# Patient Record
Sex: FEMALE | Race: WHITE | NOT HISPANIC OR LATINO | Employment: OTHER | URBAN - METROPOLITAN AREA
[De-identification: names, ages, dates, MRNs, and addresses within clinical notes are randomized per-mention and may not be internally consistent; named-entity substitution may affect disease eponyms.]

---

## 2017-01-30 RX ORDER — BIMATOPROST 0.3 MG/ML
1 SOLUTION/ DROPS OPHTHALMIC NIGHTLY
Qty: 3 ML | Refills: 11 | Status: SHIPPED | OUTPATIENT
Start: 2017-01-30 | End: 2017-03-28 | Stop reason: SDUPTHER

## 2017-01-30 RX ORDER — BRIMONIDINE TARTRATE AND TIMOLOL MALEATE 2; 5 MG/ML; MG/ML
1 SOLUTION OPHTHALMIC 2 TIMES DAILY
Qty: 10 ML | Refills: 11 | Status: SHIPPED | OUTPATIENT
Start: 2017-01-30 | End: 2017-03-28 | Stop reason: ALTCHOICE

## 2017-01-31 ENCOUNTER — OFFICE VISIT (OUTPATIENT)
Dept: CARDIOLOGY | Facility: CLINIC | Age: 77
End: 2017-01-31
Payer: COMMERCIAL

## 2017-01-31 ENCOUNTER — CLINICAL SUPPORT (OUTPATIENT)
Dept: CARDIOLOGY | Facility: CLINIC | Age: 77
End: 2017-01-31
Payer: COMMERCIAL

## 2017-01-31 VITALS
DIASTOLIC BLOOD PRESSURE: 60 MMHG | HEART RATE: 50 BPM | SYSTOLIC BLOOD PRESSURE: 163 MMHG | HEIGHT: 65 IN | WEIGHT: 137.56 LBS | BODY MASS INDEX: 22.92 KG/M2

## 2017-01-31 DIAGNOSIS — R00.1 BRADYCARDIA: ICD-10-CM

## 2017-01-31 DIAGNOSIS — Z86.79 HISTORY OF ATRIAL FIBRILLATION: ICD-10-CM

## 2017-01-31 DIAGNOSIS — E87.1 HYPONATREMIA: ICD-10-CM

## 2017-01-31 DIAGNOSIS — I10 ESSENTIAL HYPERTENSION: Primary | ICD-10-CM

## 2017-01-31 DIAGNOSIS — R55 NEAR SYNCOPE: ICD-10-CM

## 2017-01-31 DIAGNOSIS — Z95.818 STATUS POST PLACEMENT OF IMPLANTABLE LOOP RECORDER: ICD-10-CM

## 2017-01-31 PROCEDURE — 1159F MED LIST DOCD IN RCRD: CPT | Mod: S$GLB,,, | Performed by: INTERNAL MEDICINE

## 2017-01-31 PROCEDURE — 3078F DIAST BP <80 MM HG: CPT | Mod: S$GLB,,, | Performed by: INTERNAL MEDICINE

## 2017-01-31 PROCEDURE — 99214 OFFICE O/P EST MOD 30 MIN: CPT | Mod: S$GLB,,, | Performed by: INTERNAL MEDICINE

## 2017-01-31 PROCEDURE — 3077F SYST BP >= 140 MM HG: CPT | Mod: S$GLB,,, | Performed by: INTERNAL MEDICINE

## 2017-01-31 PROCEDURE — 1157F ADVNC CARE PLAN IN RCRD: CPT | Mod: S$GLB,,, | Performed by: INTERNAL MEDICINE

## 2017-01-31 PROCEDURE — 1126F AMNT PAIN NOTED NONE PRSNT: CPT | Mod: S$GLB,,, | Performed by: INTERNAL MEDICINE

## 2017-01-31 PROCEDURE — 1160F RVW MEDS BY RX/DR IN RCRD: CPT | Mod: S$GLB,,, | Performed by: INTERNAL MEDICINE

## 2017-01-31 PROCEDURE — 99999 PR PBB SHADOW E&M-EST. PATIENT-LVL II: CPT | Mod: PBBFAC,,, | Performed by: INTERNAL MEDICINE

## 2017-01-31 PROCEDURE — 93291 INTERROG DEV EVAL SCRMS IP: CPT | Mod: S$GLB,,, | Performed by: INTERNAL MEDICINE

## 2017-01-31 RX ORDER — LISINOPRIL 10 MG/1
TABLET ORAL
Refills: 3 | Status: ON HOLD | COMMUNITY
Start: 2017-01-03 | End: 2017-08-07 | Stop reason: HOSPADM

## 2017-01-31 RX ORDER — BUSPIRONE HYDROCHLORIDE 5 MG/1
TABLET ORAL
Refills: 5 | Status: ON HOLD | COMMUNITY
Start: 2016-12-29 | End: 2017-08-07 | Stop reason: HOSPADM

## 2017-01-31 NOTE — PROGRESS NOTES
Subjective:    Patient ID:  April Lea is a 76 y.o. female who presents for follow-up of Hypertension      HPI  She comes with no complaints, no chest pain, no shortness of breath      Review of Systems   Constitution: Negative for decreased appetite, weakness, malaise/fatigue, weight gain and weight loss.   Cardiovascular: Negative for chest pain, dyspnea on exertion, leg swelling, palpitations and syncope.   Respiratory: Negative for cough and shortness of breath.    Gastrointestinal: Negative.    All other systems reviewed and are negative.       Objective:    Physical Exam   Constitutional: She is oriented to person, place, and time. She appears well-developed and well-nourished.   HENT:   Head: Normocephalic.   Eyes: Pupils are equal, round, and reactive to light.   Neck: Normal range of motion. Neck supple. No JVD present. Carotid bruit is not present. No thyromegaly present.   Cardiovascular: Normal rate, regular rhythm, normal heart sounds, intact distal pulses and normal pulses.  PMI is not displaced.  Exam reveals no gallop.    No murmur heard.  Pulmonary/Chest: Effort normal and breath sounds normal.   Abdominal: Soft. Normal appearance. She exhibits no mass. There is no hepatosplenomegaly. There is no tenderness.   Musculoskeletal: Normal range of motion. She exhibits no edema.   Neurological: She is alert and oriented to person, place, and time. She has normal strength and normal reflexes. No sensory deficit.   Skin: Skin is warm and intact.   Psychiatric: She has a normal mood and affect.   Nursing note and vitals reviewed.        Assessment:       1. Essential hypertension    2. History of atrial fibrillation    3. Hyponatremia         Plan:     Continue all cardiac medications  6 m f/u

## 2017-02-28 PROBLEM — R55 SYNCOPE: Status: ACTIVE | Noted: 2017-02-28

## 2017-02-28 PROBLEM — E16.2 HYPOGLYCEMIA: Status: ACTIVE | Noted: 2017-02-28

## 2017-03-01 PROBLEM — E16.2 HYPOGLYCEMIA: Status: RESOLVED | Noted: 2017-02-28 | Resolved: 2017-03-01

## 2017-03-04 PROBLEM — I49.5 SICK SINUS SYNDROME: Status: ACTIVE | Noted: 2017-03-04

## 2017-03-04 PROBLEM — E10.59 TYPE 1 DIABETES MELLITUS WITH CIRCULATORY COMPLICATION: Status: ACTIVE | Noted: 2017-03-04

## 2017-03-06 ENCOUNTER — TELEPHONE (OUTPATIENT)
Dept: CARDIOLOGY | Facility: CLINIC | Age: 77
End: 2017-03-06

## 2017-03-06 DIAGNOSIS — I49.5 SICK SINUS SYNDROME: ICD-10-CM

## 2017-03-06 DIAGNOSIS — Z95.0 CARDIAC PACEMAKER IN SITU: Primary | ICD-10-CM

## 2017-03-06 NOTE — TELEPHONE ENCOUNTER
----- Message from Narinder Mishra sent at 3/6/2017  8:20 AM CST -----  Contact: pt's daughter Rita Honeycutt needs a hospital f/u appt(1 week)  Call Back#[phone]  Thanks

## 2017-03-15 ENCOUNTER — DOCUMENTATION ONLY (OUTPATIENT)
Dept: CARDIOLOGY | Facility: CLINIC | Age: 77
End: 2017-03-15

## 2017-03-15 ENCOUNTER — CLINICAL SUPPORT (OUTPATIENT)
Dept: CARDIOLOGY | Facility: CLINIC | Age: 77
End: 2017-03-15
Payer: COMMERCIAL

## 2017-03-15 DIAGNOSIS — Z95.0 CARDIAC PACEMAKER IN SITU: ICD-10-CM

## 2017-03-15 DIAGNOSIS — I49.5 SICK SINUS SYNDROME: ICD-10-CM

## 2017-03-15 PROCEDURE — 93280 PM DEVICE PROGR EVAL DUAL: CPT | Mod: S$GLB,,, | Performed by: INTERNAL MEDICINE

## 2017-03-15 RX ORDER — AMLODIPINE BESYLATE 5 MG/1
5 TABLET ORAL DAILY
COMMUNITY
Start: 2017-03-15 | End: 2017-04-10 | Stop reason: SDUPTHER

## 2017-03-15 RX ORDER — FUROSEMIDE 20 MG/1
20 TABLET ORAL
COMMUNITY
Start: 2017-03-15 | End: 2017-03-28

## 2017-03-24 ENCOUNTER — TELEPHONE (OUTPATIENT)
Dept: CARDIOLOGY | Facility: CLINIC | Age: 77
End: 2017-03-24

## 2017-03-24 NOTE — TELEPHONE ENCOUNTER
----- Message from Nichole Ruiz RN sent at 3/23/2017  9:21 AM CDT -----  Contact: daughter-Rita Rendon      ----- Message -----     From: Jinny Feliz LPN     Sent: 3/17/2017  12:39 PM       To: Nichole Ruiz RN    Please call daughter back re: instructions you gave at [facility]. Please call her back  ----- Message -----     From: Irina Oakes     Sent: 3/17/2017  11:57 AM       To: [facility] Staff    Needs to discuss Lasiks prescription. Please call back at .

## 2017-03-27 ENCOUNTER — TELEPHONE (OUTPATIENT)
Dept: CARDIOLOGY | Facility: CLINIC | Age: 77
End: 2017-03-27

## 2017-03-27 NOTE — TELEPHONE ENCOUNTER
Ms Rendon called in referrence to her mother April Lea.  She has received the monitor for her pacemaker.  Would like a call to check if it is hooked up correctly.

## 2017-03-27 NOTE — TELEPHONE ENCOUNTER
Ms. Rendon is calling in reference to a refill of the Metoprolol that was given in the hospital to her mother. The RX will run out prior to Ms. Hdzpipe seeing Dr. Garcia on 04/18/17.  Please call with refill.

## 2017-03-28 ENCOUNTER — OFFICE VISIT (OUTPATIENT)
Dept: OPHTHALMOLOGY | Facility: CLINIC | Age: 77
End: 2017-03-28
Attending: OPHTHALMOLOGY
Payer: COMMERCIAL

## 2017-03-28 DIAGNOSIS — H35.413 LATTICE DEGENERATION OF RETINA, BILATERAL: ICD-10-CM

## 2017-03-28 DIAGNOSIS — H40.033 ANATOMICAL NARROW ANGLE GLAUCOMA, BILATERAL: Primary | ICD-10-CM

## 2017-03-28 DIAGNOSIS — H20.12 CHRONIC ANTERIOR UVEITIS, LEFT: ICD-10-CM

## 2017-03-28 DIAGNOSIS — H52.7 REFRACTIVE ERROR: ICD-10-CM

## 2017-03-28 DIAGNOSIS — H43.819 PVD (POSTERIOR VITREOUS DETACHMENT), UNSPECIFIED LATERALITY: ICD-10-CM

## 2017-03-28 DIAGNOSIS — Z96.1 PSEUDOPHAKIA: ICD-10-CM

## 2017-03-28 DIAGNOSIS — H40.033 ANATOMICAL NARROW ANGLE GLAUCOMA, BILATERAL: ICD-10-CM

## 2017-03-28 DIAGNOSIS — E10.9 TYPE 1 DIABETES MELLITUS WITHOUT RETINOPATHY: ICD-10-CM

## 2017-03-28 DIAGNOSIS — H25.11 NUCLEAR SCLEROSIS, RIGHT: ICD-10-CM

## 2017-03-28 DIAGNOSIS — H35.723 RETINAL PIGMENT EPITHELIAL DETACHMENT OF BOTH EYES: ICD-10-CM

## 2017-03-28 DIAGNOSIS — D31.31 CHOROIDAL NEVUS, RIGHT: ICD-10-CM

## 2017-03-28 PROCEDURE — 92012 INTRM OPH EXAM EST PATIENT: CPT | Mod: S$GLB,,, | Performed by: OPHTHALMOLOGY

## 2017-03-28 PROCEDURE — 92083 EXTENDED VISUAL FIELD XM: CPT | Mod: S$GLB,,, | Performed by: OPHTHALMOLOGY

## 2017-03-28 PROCEDURE — 99999 PR PBB SHADOW E&M-EST. PATIENT-LVL III: CPT | Mod: PBBFAC,,, | Performed by: OPHTHALMOLOGY

## 2017-03-28 PROCEDURE — 76514 ECHO EXAM OF EYE THICKNESS: CPT | Mod: S$GLB,,, | Performed by: OPHTHALMOLOGY

## 2017-03-28 RX ORDER — BIMATOPROST 0.3 MG/ML
1 SOLUTION/ DROPS OPHTHALMIC NIGHTLY
Qty: 3 ML | Refills: 11 | Status: SHIPPED | OUTPATIENT
Start: 2017-03-28 | End: 2018-04-12 | Stop reason: SDUPTHER

## 2017-03-28 RX ORDER — DORZOLAMIDE HYDROCHLORIDE AND TIMOLOL MALEATE 20; 5 MG/ML; MG/ML
1 SOLUTION/ DROPS OPHTHALMIC 2 TIMES DAILY
Qty: 10 ML | Refills: 11 | Status: SHIPPED | OUTPATIENT
Start: 2017-03-28 | End: 2017-03-28 | Stop reason: SDUPTHER

## 2017-03-28 RX ORDER — BRIMONIDINE TARTRATE 2 MG/ML
1 SOLUTION/ DROPS OPHTHALMIC 3 TIMES DAILY
Qty: 10 ML | Refills: 11 | Status: SHIPPED | OUTPATIENT
Start: 2017-03-28 | End: 2017-03-28 | Stop reason: SDUPTHER

## 2017-03-28 RX ORDER — BIMATOPROST 0.3 MG/ML
1 SOLUTION/ DROPS OPHTHALMIC NIGHTLY
Qty: 3 ML | Refills: 11 | Status: SHIPPED | OUTPATIENT
Start: 2017-03-28 | End: 2017-03-28 | Stop reason: SDUPTHER

## 2017-03-28 RX ORDER — DORZOLAMIDE HYDROCHLORIDE AND TIMOLOL MALEATE 20; 5 MG/ML; MG/ML
1 SOLUTION/ DROPS OPHTHALMIC 2 TIMES DAILY
Qty: 10 ML | Refills: 11 | Status: ON HOLD | OUTPATIENT
Start: 2017-03-28 | End: 2017-08-07 | Stop reason: HOSPADM

## 2017-03-28 RX ORDER — BRIMONIDINE TARTRATE 2 MG/ML
1 SOLUTION/ DROPS OPHTHALMIC 3 TIMES DAILY
Qty: 10 ML | Refills: 11 | Status: SHIPPED | OUTPATIENT
Start: 2017-03-28 | End: 2017-09-05

## 2017-03-28 NOTE — PROGRESS NOTES
"HPI     3 month IOP, CCT, HVF today. Denies eye pain , using Lumigan ou HS,   Combigan ou bid, Dorzolomide ou TID, Illevro OS daily. States she would   like to switch the Illevro because its cost way to much. States gtt   compliance.        Last edited by Hiwot He on 3/28/2017  9:19 AM.         Assessment /Plan     For exam results, see Encounter Report.    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 To replace Ilevro  Dispense: 3 mL; Refill: 11  -     bimatoprost (LUMIGAN) 0.03 % ophthalmic drops; Place 1 drop into the left eye every evening.  Dispense: 3 mL; Refill: 11  -     brimonidine 0.2% (ALPHAGAN) 0.2 % Drop; Place 1 drop into the left eye 3 (three) times daily. Use when Dorzolamide runs out (orange lid)  Dispense: 10 mL; Refill: 11  -     dorzolamide-timolol 2-0.5% (COSOPT) 22.3-6.8 mg/mL ophthalmic solution; Place 1 drop into both eyes 2 (two) times daily. Use when Combigan runs out  Dispense: 10 mL; Refill: 11            Anatomical narrow angle glaucoma, bilateral - appears controlled on Lumigan, Combigan, and Dorzolamide - will switch to generics in future (keeping generic bimatoprost, switching Combigan to Cosopt BID OU, switch Dorzolamide to Brimonidine TID OS. HRT done last visit - borderline SN OD, OS thin ST borderline SN. HVF today 3/28/17 - scattered defects, "WNL". /570. RTC 4 months for IOP check.     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 RD precautions.    Choroidal nevus, right - flat with no SRF, no orange, no significant drusen noted    PVD (posterior vitreous detachment), unspecified laterality - RD precautions    Refractive error -  Minimal change              Records reviewed from Deepak Jimenez M.D. - I cannot read the dates?  Looks like last note from Dr. Jimenez was 4/4/16  Tn 20 OD, 21 OS at 1:40 pm  Tmax 23 OD, 36 OS  Conj - rare follicle  K - mild punctate stain I OD, mod OS; no edema; on old KP central OS, min endopigment OS  AC - clear OD; trace flare no cells OS  P - 3.0-3.5 OD, RRL; 3.5-4.0 OS with sphincterotomy I, otherwise RRL  I - no NV  PCIOL OS  DFE OD:  C:D 0.50 x 0.35, oval  Macula - pigment mottling, rare MA T macula, no edema/exudate  Rare blot heme outside macula, few MA;s  PVD with Vizcarra ring  Nevus I 2.5 x 2.0 DD in size  Point traction at 10:45  Vit traction 10:00 mid periphery  Lattice 4:00-5:00; 5:00-5:30, 5:45-6:00; 6:00-6:30  No RT's  DFE OS:  C:D 0.55 x 0.35-0.4  Macula with shallow RPE detachments S macula, rare MA T macuula  No edema or exudates  No blot H  Lattice 10:45-11:00, 11:45-12:15, 1:30-1:45, 4:00-6:00  Point traction posterior to lattice 1:30    Prior note;  Gonio OD - grade IV open 360 to scleral spur, no PAS, iris processes greatest N, mild narrowing of approach  Gonio OS - grade IV open 360 to scleral spur except for PAS 4:00, 4:30, 5:30, 5:45 with grade II open 5:45-7:00, PAS 7:00-9:00, 9;15, 9:30, 10:00, 10:15, 1:30, 1:40, 2:00, 2:30  Mild narrowing of approach  Tn 18//20 at 1:45 pm    Dr. Cheryl Reddy 9/26/16  Ta 11//9  Conj cyst OS  K - rare PEE's OD; K scars OS, rare endopigment OS, diffuse PEE's  Nevanac decreased to TWICE  DAILY, LS TWICE DAILY OS, PF AT's  Meds; Lumigain 0.03% OS, Combigan Q12h OU, Dorzolamide TID OS

## 2017-03-28 NOTE — MR AVS SNAPSHOT
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       These Medications        Disp Refills Start End    nepafenac (NEVANAC) 0.1 % ophthalmic suspension 3 mL 11 3/28/2017     Place 1 drop into the left eye 3 (three) times daily. To replace Ilevro - Left Eye    [facility] [address] [place] [address] AT [facility] [address] [facility] [address] Ph #: [phone]       bimatoprost (LUMIGAN) 0.03 % ophthalmic drops 3 mL 11 3/28/2017     Place 1 drop into the left eye every evening. - Left Eye    Pharmacy: [facility] [address] [place] [address] [facility] [address] Ph #: [phone]       brimonidine 0.2% (ALPHAGAN) 0.2 % Drop 10 mL 11 3/28/2017 3/28/2018    Place 1 drop into the left eye 3 (three) times daily. Use when Dorzolamide  runs out (orange lid) - Left Eye    Pharmacy: [email or web address] [id number] [place], [place] [id number] AT [facility] [address] Ph #: [phone]       dorzolamide-timolol 2-0.5% (COSOPT) 22.3-6.8 mg/mL ophthalmic solution 10 mL 11 3/28/2017 3/28/2018    Place 1 drop into both eyes 2 (two) times daily. Use when Combigan runs out - Both Eyes    Pharmacy: [facility] [address] [place] [address] [address] AT [facility] [address] Ph #: [phone]         [facility] On Call     [facility] - 24/7 Assistance  Registered nurses in the [facility] provide clinical advisement, health education, appointment booking, and other advisory services.  Call for this free service at [phone].             Medications           Message regarding Medications     Verify the changes and/or additions to your medication regime listed below are the same as discussed with your clinician today.  If any of these changes or additions are incorrect, please notify your healthcare provider.        START taking these NEW medications        Refills    brimonidine 0.2% (ALPHAGAN) 0.2 % Drop 11    Sig: Place 1 drop into the left eye 3 (three) times daily. Use when Dorzolamide runs out (orange lid)    Class: Print    Route: Left Eye    dorzolamide-timolol 2-0.5% (COSOPT) 22.3-6.8 mg/mL ophthalmic solution 11    Sig: Place 1 drop into both eyes 2 (two) times daily. Use when Combigan runs out    Class: Print    Route: Both Eyes      CHANGE how you are taking these medications     Start Taking Instead of    nepafenac (NEVANAC) 0.1 % ophthalmic suspension nepafenac (NEVANAC) 0.1 % ophthalmic suspension  (Previously Discontinued)    Dosage:  Place 1 drop into the left eye 3 (three) times daily. To replace Ilevro Dosage:  Place 3 drops into the left eye 4 (four) times daily.    Reason for Change:  Alternate therapy       STOP taking these medications     alendronate (FOSAMAX) 70 MG tablet Take 70 mg  by mouth every 7 days.    furosemide (LASIX) 20 MG tablet Take 20 mg by mouth as needed (May take up to twice per week as needed for swelling).    brimonidine-timolol (COMBIGAN) 0.2-0.5 % Drop Place 1 drop into both eyes 2 (two) times daily.    dorzolamide (TRUSOPT) 2 % ophthalmic solution Place 1 drop into the left eye 3 (three) times daily.           Verify that the below list of medications is an accurate representation of the medications you are currently taking.  If none reported, the list may be blank. If incorrect, please contact your healthcare provider. Carry this list with you in case of emergency.           Current Medications     amiodarone (PACERONE) 200 MG Tab Take 1 tablet (200 mg total) by mouth every Mon, Tues, Wed, Thurs, Fri.    amlodipine (NORVASC) 5 MG tablet Take 5 mg by mouth once daily.    aspirin 81 MG Chew Take 81 mg by mouth once daily.    bimatoprost (LUMIGAN) 0.03 % ophthalmic drops Place 1 drop into the left eye every evening.    brimonidine 0.2% (ALPHAGAN) 0.2 % Drop Place 1 drop into the left eye 3 (three) times daily. Use when Dorzolamide runs out (orange lid)    busPIRone (BUSPAR) 5 MG Tab Take 10 mg By Mouth Twice a Day    calcium citrate (CALCITRATE) 200 mg (950 mg) tablet Take 1,000 mg by mouth once daily.     dorzolamide-timolol 2-0.5% (COSOPT) 22.3-6.8 mg/mL ophthalmic solution Place 1 drop into both eyes 2 (two) times daily. Use when Combigan runs out    ferrous sulfate 324 mg (65 mg iron) TbEC Take 325 mg by mouth once daily.    insulin lispro (HUMALOG) 100 unit/mL injection Inject into the skin 3 (three) times daily before meals. Insulin pump    levothyroxine (SYNTHROID) 112 MCG tablet Take 112 mcg by mouth once daily.    lisinopril 10 MG tablet Take One Tablet By Mouth Twice a Day    MAGNESIUM ORAL Take 500 mg by mouth once daily.    metoprolol succinate (TOPROL-XL) 100 MG 24 hr tablet Take 1 tablet (100 mg total) by mouth every morning.    MULTIVIT-IRON-MIN-FOLIC ACID  3,500-18-0.4 UNIT-MG-MG ORAL CHEW Take 1 tablet by mouth once daily.     nepafenac (NEVANAC) 0.1 % ophthalmic suspension Place 1 drop into the left eye 3 (three) times daily. To replace Ilevro    pravastatin (PRAVACHOL) 40 MG tablet Take 40 mg by mouth once daily.     vitamin D 1000 units Tab Take 185 mg by mouth once daily.           Clinical Reference Information           Allergies as of 3/28/2017     No Known Allergies      Immunizations Administered on Date of Encounter - 3/28/2017     None      [facility] Sign-Up     Activating your [facility] account is as easy as 1-2-3!     1) Visit [email or web address], select Sign Up Now, enter this activation code and your date of birth, then select Next.  [email or web address]  Expires: 4/19/2017 12:01 PM      2) Create a username and password to use when you visit [facility] in the future and select a security question in case you lose your password and select Next.    3) Enter your e-mail address and click Sign Up!    Additional Information  If you have questions, please e-mail [email or web address] or call [phone] to talk to our [facility] staff. Remember, [facility] is NOT to be used for urgent needs. For medical emergencies, dial 911.         Language Assistance Services     ATTENTION: Language assistance services are available, free of charge. Please call [phone].      ATENCIÓN: Si habla español, tiene a murillo disposición servicios gratuitos de asistencia lingüística. Llame al [phone].     [facility] Ý: N?u b?n nói Ti?ng Vi?t, có các d?ch v? h? tr? ngôn ng? mi?n phí dành cho b?n. G?i s? [phone].         [facility] complies with applicable Federal civil rights laws and does not discriminate on the basis of race, color, national origin, age, disability, or sex.

## 2017-03-29 RX ORDER — METOPROLOL SUCCINATE 100 MG/1
100 TABLET, EXTENDED RELEASE ORAL EVERY MORNING
Qty: 30 TABLET | Refills: 5 | Status: ON HOLD | OUTPATIENT
Start: 2017-03-29 | End: 2017-08-07 | Stop reason: HOSPADM

## 2017-03-30 ENCOUNTER — DOCUMENTATION ONLY (OUTPATIENT)
Dept: CARDIOLOGY | Facility: CLINIC | Age: 77
End: 2017-03-30

## 2017-03-30 NOTE — PROGRESS NOTES
Patient requested refill of Metoprolol Succinate 100mg every day (ordered while in hospital).  Reviewed w/ Dr. Byrnes and called into [facility] [phone] (Phone).

## 2017-04-10 RX ORDER — AMLODIPINE BESYLATE 5 MG/1
5 TABLET ORAL DAILY
Qty: 90 TABLET | Refills: 3 | Status: ON HOLD | OUTPATIENT
Start: 2017-04-10 | End: 2017-08-07 | Stop reason: HOSPADM

## 2017-04-18 ENCOUNTER — OFFICE VISIT (OUTPATIENT)
Dept: CARDIOLOGY | Facility: CLINIC | Age: 77
End: 2017-04-18
Payer: COMMERCIAL

## 2017-04-18 ENCOUNTER — CLINICAL SUPPORT (OUTPATIENT)
Dept: CARDIOLOGY | Facility: CLINIC | Age: 77
End: 2017-04-18
Payer: COMMERCIAL

## 2017-04-18 VITALS
DIASTOLIC BLOOD PRESSURE: 62 MMHG | BODY MASS INDEX: 23.47 KG/M2 | HEIGHT: 65 IN | HEART RATE: 60 BPM | WEIGHT: 140.88 LBS | SYSTOLIC BLOOD PRESSURE: 131 MMHG

## 2017-04-18 DIAGNOSIS — I49.5 SICK SINUS SYNDROME: ICD-10-CM

## 2017-04-18 DIAGNOSIS — Z95.0 CARDIAC PACEMAKER IN SITU: ICD-10-CM

## 2017-04-18 DIAGNOSIS — I10 ESSENTIAL HYPERTENSION: ICD-10-CM

## 2017-04-18 DIAGNOSIS — I48.0 PAF (PAROXYSMAL ATRIAL FIBRILLATION): Primary | ICD-10-CM

## 2017-04-18 PROCEDURE — 3078F DIAST BP <80 MM HG: CPT | Mod: S$GLB,,, | Performed by: INTERNAL MEDICINE

## 2017-04-18 PROCEDURE — 99999 PR PBB SHADOW E&M-EST. PATIENT-LVL II: CPT | Mod: PBBFAC,,, | Performed by: INTERNAL MEDICINE

## 2017-04-18 PROCEDURE — 93280 PM DEVICE PROGR EVAL DUAL: CPT | Mod: S$GLB,,, | Performed by: INTERNAL MEDICINE

## 2017-04-18 PROCEDURE — 99024 POSTOP FOLLOW-UP VISIT: CPT | Mod: S$GLB,,, | Performed by: INTERNAL MEDICINE

## 2017-04-18 PROCEDURE — 1159F MED LIST DOCD IN RCRD: CPT | Mod: S$GLB,,, | Performed by: INTERNAL MEDICINE

## 2017-04-18 PROCEDURE — 1160F RVW MEDS BY RX/DR IN RCRD: CPT | Mod: S$GLB,,, | Performed by: INTERNAL MEDICINE

## 2017-04-18 PROCEDURE — 1126F AMNT PAIN NOTED NONE PRSNT: CPT | Mod: S$GLB,,, | Performed by: INTERNAL MEDICINE

## 2017-04-18 PROCEDURE — 3075F SYST BP GE 130 - 139MM HG: CPT | Mod: S$GLB,,, | Performed by: INTERNAL MEDICINE

## 2017-04-18 RX ORDER — BRIMONIDINE TARTRATE AND TIMOLOL MALEATE 2; 5 MG/ML; MG/ML
1 SOLUTION OPHTHALMIC
Status: ON HOLD | COMMUNITY
End: 2017-08-07 | Stop reason: HOSPADM

## 2017-04-18 NOTE — PROGRESS NOTES
Subjective:    Patient ID:  April Lea is a 76 y.o. female who presents for follow-up of SSS    HPI  She comes with no complaints, no chest pain, no shortness of breath  Has PPM 6 weeks ago for SSS    Review of Systems   Constitution: Negative for decreased appetite, weakness, malaise/fatigue, weight gain and weight loss.   Cardiovascular: Negative for chest pain, dyspnea on exertion, leg swelling, palpitations and syncope.   Respiratory: Negative for cough and shortness of breath.    Gastrointestinal: Negative.    All other systems reviewed and are negative.       Objective:    Physical Exam   Constitutional: She is oriented to person, place, and time. She appears well-developed and well-nourished.   HENT:   Head: Normocephalic.   Eyes: Pupils are equal, round, and reactive to light.   Neck: Normal range of motion. Neck supple. No JVD present. Carotid bruit is not present. No thyromegaly present.   Cardiovascular: Normal rate, regular rhythm, normal heart sounds, intact distal pulses and normal pulses.  PMI is not displaced.  Exam reveals no gallop.    No murmur heard.  Pulmonary/Chest: Effort normal and breath sounds normal.   Abdominal: Soft. Normal appearance. She exhibits no mass. There is no hepatosplenomegaly. There is no tenderness.   Musculoskeletal: Normal range of motion. She exhibits no edema.   Neurological: She is alert and oriented to person, place, and time. She has normal strength and normal reflexes. No sensory deficit.   Skin: Skin is warm and intact.   Psychiatric: She has a normal mood and affect.   Nursing note and vitals reviewed.        Assessment:       1. PAF (paroxysmal atrial fibrillation)    2. Essential hypertension    3. Sick sinus syndrome         Plan:     Continue all cardiac medications  Regular exercise program  6 m f/u

## 2017-06-12 RX ORDER — AMIODARONE HYDROCHLORIDE 200 MG/1
200 TABLET ORAL
Qty: 20 TABLET | Refills: 1 | Status: ON HOLD | OUTPATIENT
Start: 2017-06-12 | End: 2017-08-07 | Stop reason: HOSPADM

## 2017-07-22 PROBLEM — G45.9 TIA (TRANSIENT ISCHEMIC ATTACK): Status: ACTIVE | Noted: 2017-07-22

## 2017-07-23 PROBLEM — I63.9 ACUTE CVA (CEREBROVASCULAR ACCIDENT): Status: ACTIVE | Noted: 2017-07-23

## 2017-07-23 PROBLEM — I63.521 ACUTE ISCHEMIC RIGHT ANTERIOR CEREBRAL ARTERY (ACA) STROKE: Status: ACTIVE | Noted: 2017-07-23

## 2017-07-23 PROBLEM — R51.9 HEADACHE: Status: ACTIVE | Noted: 2017-07-23

## 2017-07-23 PROBLEM — G45.9 TIA (TRANSIENT ISCHEMIC ATTACK): Status: RESOLVED | Noted: 2017-07-22 | Resolved: 2017-07-23

## 2017-07-23 PROBLEM — G60.9 IDIOPATHIC PERIPHERAL NEUROPATHY: Status: ACTIVE | Noted: 2017-07-23

## 2017-07-23 PROBLEM — I63.412 CEREBRAL INFARCTION DUE TO EMBOLISM OF LEFT MIDDLE CEREBRAL ARTERY: Status: ACTIVE | Noted: 2017-07-23

## 2017-07-23 PROBLEM — I63.432 CEREBRAL INFARCTION DUE TO EMBOLISM OF LEFT POSTERIOR CEREBRAL ARTERY: Status: ACTIVE | Noted: 2017-07-23

## 2017-07-23 PROBLEM — I63.89 ACUTE ARTERIAL ISCHEMIC STROKE, MULTIFOCAL, MULTIPLE VASCULAR TERRITORIES: Status: ACTIVE | Noted: 2017-07-23

## 2017-08-01 PROBLEM — G60.9 IDIOPATHIC PERIPHERAL NEUROPATHY: Status: ACTIVE | Noted: 2017-08-01

## 2017-08-07 ENCOUNTER — TELEPHONE (OUTPATIENT)
Dept: CARDIOLOGY | Facility: CLINIC | Age: 77
End: 2017-08-07

## 2017-08-07 NOTE — TELEPHONE ENCOUNTER
----- Message from Marilee Peters sent at 8/7/2017  2:58 PM CDT -----  Contact: Daughter Zoya [phone]  She was discharged from [facility] today, she was admitted for a stroke.  She was instructed to follow up with you in 2 weeks. Please call her about fitting her in.  Thank you!

## 2017-08-24 ENCOUNTER — OFFICE VISIT (OUTPATIENT)
Dept: CARDIOLOGY | Facility: CLINIC | Age: 77
End: 2017-08-24
Payer: COMMERCIAL

## 2017-08-24 VITALS
BODY MASS INDEX: 22.96 KG/M2 | SYSTOLIC BLOOD PRESSURE: 128 MMHG | HEIGHT: 66 IN | HEART RATE: 59 BPM | DIASTOLIC BLOOD PRESSURE: 56 MMHG | WEIGHT: 142.88 LBS

## 2017-08-24 DIAGNOSIS — E10.59 TYPE 1 DIABETES MELLITUS WITH OTHER CIRCULATORY COMPLICATION: ICD-10-CM

## 2017-08-24 DIAGNOSIS — I49.5 SICK SINUS SYNDROME: ICD-10-CM

## 2017-08-24 DIAGNOSIS — I63.432 CEREBRAL INFARCTION DUE TO EMBOLISM OF LEFT POSTERIOR CEREBRAL ARTERY: ICD-10-CM

## 2017-08-24 DIAGNOSIS — Z86.79 HISTORY OF ATRIAL FIBRILLATION: Primary | ICD-10-CM

## 2017-08-24 DIAGNOSIS — I10 ESSENTIAL HYPERTENSION: ICD-10-CM

## 2017-08-24 PROCEDURE — 99214 OFFICE O/P EST MOD 30 MIN: CPT | Mod: S$GLB,,, | Performed by: INTERNAL MEDICINE

## 2017-08-24 PROCEDURE — 1159F MED LIST DOCD IN RCRD: CPT | Mod: S$GLB,,, | Performed by: INTERNAL MEDICINE

## 2017-08-24 PROCEDURE — 3008F BODY MASS INDEX DOCD: CPT | Mod: S$GLB,,, | Performed by: INTERNAL MEDICINE

## 2017-08-24 PROCEDURE — 1157F ADVNC CARE PLAN IN RCRD: CPT | Mod: S$GLB,,, | Performed by: INTERNAL MEDICINE

## 2017-08-24 PROCEDURE — 3078F DIAST BP <80 MM HG: CPT | Mod: S$GLB,,, | Performed by: INTERNAL MEDICINE

## 2017-08-24 PROCEDURE — 1126F AMNT PAIN NOTED NONE PRSNT: CPT | Mod: S$GLB,,, | Performed by: INTERNAL MEDICINE

## 2017-08-24 PROCEDURE — 3074F SYST BP LT 130 MM HG: CPT | Mod: S$GLB,,, | Performed by: INTERNAL MEDICINE

## 2017-08-24 PROCEDURE — 99999 PR PBB SHADOW E&M-EST. PATIENT-LVL II: CPT | Mod: PBBFAC,,, | Performed by: INTERNAL MEDICINE

## 2017-08-24 NOTE — PROGRESS NOTES
Subjective:    Patient ID:  April Lea is a 77 y.o. female who presents for follow-up of CVD    HPI  Admitted to [facility] last month with CVA, no evidence of atrial fibrillation  Started on xarelto  She comes with no complaints, no chest pain, no shortness of breath      Review of Systems   Constitution: Negative for decreased appetite, weakness, malaise/fatigue, weight gain and weight loss.   Cardiovascular: Negative for chest pain, dyspnea on exertion, leg swelling, palpitations and syncope.   Respiratory: Negative for cough and shortness of breath.    Gastrointestinal: Negative.    All other systems reviewed and are negative.       Objective:    Physical Exam   Constitutional: She is oriented to person, place, and time. She appears well-developed and well-nourished.   HENT:   Head: Normocephalic.   Eyes: Pupils are equal, round, and reactive to light.   Neck: Normal range of motion. Neck supple. No JVD present. Carotid bruit is not present. No thyromegaly present.   Cardiovascular: Normal rate, regular rhythm, normal heart sounds, intact distal pulses and normal pulses.  PMI is not displaced.  Exam reveals no gallop.    No murmur heard.  Pulmonary/Chest: Effort normal and breath sounds normal.   Abdominal: Soft. Normal appearance. She exhibits no mass. There is no hepatosplenomegaly. There is no tenderness.   Musculoskeletal: Normal range of motion. She exhibits no edema.   Neurological: She is alert and oriented to person, place, and time. She has normal strength and normal reflexes. No sensory deficit.   Skin: Skin is warm and intact.   Psychiatric: She has a normal mood and affect.   Nursing note and vitals reviewed.        Assessment:       1. History of atrial fibrillation    2. Essential hypertension    3. Sick sinus syndrome    4. Type 1 diabetes mellitus with other circulatory complication    5. Cerebral infarction due to embolism of left posterior cerebral artery         Plan:     Continue all cardiac  medications  Regular exercise program  6 m f/u

## 2017-08-28 ENCOUNTER — CLINICAL SUPPORT (OUTPATIENT)
Dept: CARDIOLOGY | Facility: CLINIC | Age: 77
End: 2017-08-28
Payer: COMMERCIAL

## 2017-08-28 DIAGNOSIS — I49.5 SICK SINUS SYNDROME: ICD-10-CM

## 2017-08-28 DIAGNOSIS — Z95.0 CARDIAC PACEMAKER IN SITU: ICD-10-CM

## 2017-08-28 DIAGNOSIS — Z95.0 CARDIAC PACEMAKER IN SITU: Primary | ICD-10-CM

## 2017-08-28 PROCEDURE — 93296 REM INTERROG EVL PM/IDS: CPT | Mod: S$GLB,,, | Performed by: INTERNAL MEDICINE

## 2017-08-28 PROCEDURE — 93294 REM INTERROG EVL PM/LDLS PM: CPT | Mod: S$GLB,,, | Performed by: INTERNAL MEDICINE

## 2017-09-05 ENCOUNTER — OFFICE VISIT (OUTPATIENT)
Dept: OPHTHALMOLOGY | Facility: CLINIC | Age: 77
End: 2017-09-05
Payer: COMMERCIAL

## 2017-09-05 DIAGNOSIS — H43.819 PVD (POSTERIOR VITREOUS DETACHMENT), UNSPECIFIED LATERALITY: ICD-10-CM

## 2017-09-05 DIAGNOSIS — H35.723 RETINAL PIGMENT EPITHELIAL DETACHMENT OF BOTH EYES: ICD-10-CM

## 2017-09-05 DIAGNOSIS — H52.7 REFRACTIVE ERROR: ICD-10-CM

## 2017-09-05 DIAGNOSIS — D31.31 CHOROIDAL NEVUS, RIGHT: ICD-10-CM

## 2017-09-05 DIAGNOSIS — Z96.1 PSEUDOPHAKIA: ICD-10-CM

## 2017-09-05 DIAGNOSIS — E10.9 TYPE 1 DIABETES MELLITUS WITHOUT RETINOPATHY: ICD-10-CM

## 2017-09-05 DIAGNOSIS — H35.413 LATTICE DEGENERATION OF RETINA, BILATERAL: ICD-10-CM

## 2017-09-05 DIAGNOSIS — H25.11 NUCLEAR SCLEROSIS, RIGHT: ICD-10-CM

## 2017-09-05 DIAGNOSIS — H40.033 ANATOMICAL NARROW ANGLE GLAUCOMA, BILATERAL: Primary | ICD-10-CM

## 2017-09-05 DIAGNOSIS — H20.12 CHRONIC ANTERIOR UVEITIS, LEFT: ICD-10-CM

## 2017-09-05 PROCEDURE — 92012 INTRM OPH EXAM EST PATIENT: CPT | Mod: S$GLB,,, | Performed by: OPHTHALMOLOGY

## 2017-09-05 PROCEDURE — 99999 PR PBB SHADOW E&M-EST. PATIENT-LVL III: CPT | Mod: PBBFAC,,, | Performed by: OPHTHALMOLOGY

## 2017-09-05 RX ORDER — LEVOTHYROXINE SODIUM 75 UG/1
75 TABLET ORAL DAILY
COMMUNITY

## 2017-09-05 NOTE — PROGRESS NOTES
"HPI     Glaucoma    Additional comments: 4 month iop ck           Comments   DLS: 3/28/17    Pt states she had a stroke on 7/22/17 and was in the hospital for about 2   weeks. No visual changes that she noticed. No floaters or flashes.     Gtts: Combigan BID, AT's OU, Dorzolamide TID, Lumigan QHS, Ilevro BID OS,          Last edited by Cheryle Quintana on 9/5/2017  2:12 PM. (History)        ROS     Positive for: Neurological (CVA 7/22/17; denies neuropathy; denies   seizure/tremor/restless legs ), Musculoskeletal (osteopenia), Endocrine   (DM1 on insulin pump since age 37; hypothyroid), Cardiovascular (HTN -   controlled with meds per pt; history of irregular heartbeat; 1st degree AV   block; now has pacemaker), Eyes (CE OS couple years ago; glaucoma; history   anterior uveitis OS, RPE detachments OU), Heme/Lymph (now on Xarelto since   CVA)    Negative for: Genitourinary (denies flomax; denies nephropathy),   Respiratory (denies asthma/orthopnea)    Last edited by Malena Acevedo MD on 9/5/2017  3:03 PM.   (History)        Assessment /Plan     For exam results, see Encounter Report.    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 Last HRT - borderline SN OD, OS thin ST borderline SN. HVF today 3/28/17 - scattered defects, "WNL". /570. RTC 4 months for DFE/HRT/IOP check.     Chronic anterior uveitis, left - quiet " today. Ilevro too expensive with her insurance - will switch back to Nevanac TID OS when Ilevro runs out.    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 RD precautions.    Choroidal nevus, right - flat with no SRF, no orange, no significant drusen noted    PVD (posterior vitreous detachment), unspecified laterality - RD precautions    Refractive error -  Minimal change              Records reviewed from Deepak Jimenez M.D. - I cannot read the dates?  Looks like last note from Dr. Jimenez was 4/4/16  Tn 20 OD, 21 OS at 1:40 pm  Tmax 23 OD, 36 OS  Conj - rare follicle  K - mild punctate stain I OD, mod OS; no edema; on old KP central OS, min endopigment OS  AC - clear OD; trace flare no cells OS  P - 3.0-3.5 OD, RRL; 3.5-4.0 OS with sphincterotomy I, otherwise RRL  I - no NV  PCIOL OS  DFE OD:  C:D 0.50 x 0.35, oval  Macula - pigment mottling, rare MA T macula, no edema/exudate  Rare blot heme outside macula, few MA;s  PVD with Vizcarra ring  Nevus I 2.5 x 2.0 DD in size  Point traction at 10:45  Vit traction 10:00 mid periphery  Lattice 4:00-5:00; 5:00-5:30, 5:45-6:00; 6:00-6:30  No RT's  DFE OS:  C:D 0.55 x 0.35-0.4  Macula with shallow RPE detachments S macula, rare MA T macuula  No edema or exudates  No blot H  Lattice 10:45-11:00, 11:45-12:15, 1:30-1:45, 4:00-6:00  Point traction posterior to lattice 1:30    Prior note;  Gonio OD - grade IV open 360 to scleral spur, no PAS, iris processes greatest N, mild narrowing of approach  Gonio OS - grade IV open 360 to scleral spur except for PAS 4:00, 4:30, 5:30, 5:45 with grade II open 5:45-7:00, PAS 7:00-9:00, 9;15, 9:30, 10:00, 10:15, 1:30, 1:40, 2:00, 2:30  Mild narrowing of approach  Tn 18//20 at 1:45 pm    Dr. Cheryl Reddy  9/26/16  Ta 11//9  Conj cyst OS  K - rare PEE's OD; K scars OS, rare endopigment OS, diffuse PEE's  Nevanac decreased to TWICE DAILY, LS TWICE DAILY OS, PF AT's  Meds; Lumigain 0.03% OS, Combigan Q12h OU, Dorzolamide TID OS

## 2017-10-02 RX ORDER — AMIODARONE HYDROCHLORIDE 200 MG/1
200 TABLET ORAL
Qty: 90 TABLET | Refills: 3 | Status: SHIPPED | OUTPATIENT
Start: 2017-10-02 | End: 2017-12-05 | Stop reason: ALTCHOICE

## 2017-10-02 NOTE — TELEPHONE ENCOUNTER
----- Message from Cielo Araiza sent at 10/2/2017  1:07 PM CDT -----  Contact: Rita Rendon (Daughter)  Rita Rendon (Daughter) calling to request a new prescription for Amiodarone. Please advise.  Call back   Thanks!  [facility] [address] [place] [address] AT [facility] [address] [facility] [address]  [address] [place] [id number]  Phone: [phone] Fax: [phone]

## 2017-10-25 ENCOUNTER — TELEPHONE (OUTPATIENT)
Dept: CARDIOLOGY | Facility: CLINIC | Age: 77
End: 2017-10-25

## 2017-10-30 DIAGNOSIS — I49.5 SICK SINUS SYNDROME: ICD-10-CM

## 2017-10-30 DIAGNOSIS — Z95.0 CARDIAC PACEMAKER IN SITU: Primary | ICD-10-CM

## 2017-10-31 ENCOUNTER — IMMUNIZATION (OUTPATIENT)
Dept: FAMILY MEDICINE | Facility: CLINIC | Age: 77
End: 2017-10-31
Payer: COMMERCIAL

## 2017-10-31 ENCOUNTER — CLINICAL SUPPORT (OUTPATIENT)
Dept: CARDIOLOGY | Facility: CLINIC | Age: 77
End: 2017-10-31
Payer: COMMERCIAL

## 2017-10-31 DIAGNOSIS — I49.5 SICK SINUS SYNDROME: ICD-10-CM

## 2017-10-31 DIAGNOSIS — Z95.0 CARDIAC PACEMAKER IN SITU: ICD-10-CM

## 2017-10-31 PROCEDURE — 93280 PM DEVICE PROGR EVAL DUAL: CPT | Mod: S$GLB,,, | Performed by: INTERNAL MEDICINE

## 2017-10-31 PROCEDURE — 90471 IMMUNIZATION ADMIN: CPT | Mod: S$GLB,,, | Performed by: INTERNAL MEDICINE

## 2017-10-31 PROCEDURE — 90662 IIV NO PRSV INCREASED AG IM: CPT | Mod: S$GLB,,, | Performed by: INTERNAL MEDICINE

## 2017-12-05 ENCOUNTER — OFFICE VISIT (OUTPATIENT)
Dept: OPHTHALMOLOGY | Facility: CLINIC | Age: 77
End: 2017-12-05
Payer: COMMERCIAL

## 2017-12-05 DIAGNOSIS — H35.413 LATTICE DEGENERATION OF RETINA, BILATERAL: ICD-10-CM

## 2017-12-05 DIAGNOSIS — E10.9 TYPE 1 DIABETES MELLITUS WITHOUT RETINOPATHY: ICD-10-CM

## 2017-12-05 DIAGNOSIS — H35.372 EPIRETINAL MEMBRANE, LEFT: ICD-10-CM

## 2017-12-05 DIAGNOSIS — H20.12 CHRONIC ANTERIOR UVEITIS, LEFT: ICD-10-CM

## 2017-12-05 DIAGNOSIS — H25.11 NUCLEAR SCLEROSIS, RIGHT: ICD-10-CM

## 2017-12-05 DIAGNOSIS — D31.31 CHOROIDAL NEVUS, RIGHT: ICD-10-CM

## 2017-12-05 DIAGNOSIS — H43.819 PVD (POSTERIOR VITREOUS DETACHMENT), UNSPECIFIED LATERALITY: ICD-10-CM

## 2017-12-05 DIAGNOSIS — H35.723 RETINAL PIGMENT EPITHELIAL DETACHMENT OF BOTH EYES: ICD-10-CM

## 2017-12-05 DIAGNOSIS — H52.7 REFRACTIVE ERROR: ICD-10-CM

## 2017-12-05 DIAGNOSIS — H40.033 ANATOMICAL NARROW ANGLE GLAUCOMA, BILATERAL: Primary | ICD-10-CM

## 2017-12-05 DIAGNOSIS — Z96.1 PSEUDOPHAKIA: ICD-10-CM

## 2017-12-05 PROCEDURE — 99999 PR PBB SHADOW E&M-EST. PATIENT-LVL III: CPT | Mod: PBBFAC,,, | Performed by: OPHTHALMOLOGY

## 2017-12-05 PROCEDURE — 92014 COMPRE OPH EXAM EST PT 1/>: CPT | Mod: S$GLB,,, | Performed by: OPHTHALMOLOGY

## 2017-12-05 PROCEDURE — 92134 CPTRZ OPH DX IMG PST SGM RTA: CPT | Mod: S$GLB,,, | Performed by: OPHTHALMOLOGY

## 2017-12-05 RX ORDER — INSULIN GLARGINE 100 [IU]/ML
INJECTION, SOLUTION SUBCUTANEOUS
Refills: 2 | COMMUNITY
Start: 2017-08-29 | End: 2018-02-27

## 2017-12-05 RX ORDER — BRIMONIDINE TARTRATE, TIMOLOL MALEATE 2; 5 MG/ML; MG/ML
SOLUTION/ DROPS OPHTHALMIC
Refills: 11 | COMMUNITY
Start: 2017-11-27 | End: 2018-04-12 | Stop reason: SDUPTHER

## 2017-12-05 RX ORDER — METOPROLOL SUCCINATE 50 MG/1
TABLET, EXTENDED RELEASE ORAL
Refills: 2 | COMMUNITY
Start: 2017-10-27 | End: 2019-01-01 | Stop reason: SDUPTHER

## 2017-12-05 RX ORDER — METOPROLOL SUCCINATE 25 MG/1
TABLET, EXTENDED RELEASE ORAL
Refills: 3 | COMMUNITY
Start: 2017-11-16 | End: 2018-02-27

## 2017-12-05 RX ORDER — NEPAFENAC 3 MG/ML
SUSPENSION/ DROPS OPHTHALMIC
Refills: 11 | COMMUNITY
Start: 2017-09-25 | End: 2019-01-01 | Stop reason: ALTCHOICE

## 2017-12-05 NOTE — PROGRESS NOTES
HPI     Glaucoma    Additional comments: 3 month iop ch//    brimatoprost QHS OS, Cosopt BID   OU, not used yet still have combigan bid ou,   Dorzolamide TID OS . pt   started due to pt still having some.//  BrimonidineTID OS//           Blurred Vision    Additional comments: blurred va at near//           Comments   Blurred va at near not long after last visit//  brimatoprost QHS OS, Cosopt BID OU, not used yet still have combigan bid   ou,   Dorzolamide TID OS . pt started due to pt still having some.//    BrimonidineTID OS// illevro QD in PM OS,   End of January pt will need to   change to Nevanac TID OS,  drop due to being expensive//    Still has gtts from hospitalization in July.       Last edited by Malena Acevedo MD on 12/5/2017  2:55 PM.   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   (History)        Assessment /Plan     For exam results, see Encounter Report.    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 Today's HRT appears stable/WNL OD, OS with increased thinning. Last HVF - scattered defects, "WNL". /570. Continue same gtts for now - looks like new ERM OS - will have her come back for OCT macula.    Chronic anterior uveitis, left - quiet today. Ilevro too expensive with her insurance - will switch back to Nevanac TID OS when Ilevro runs out.    Nuclear sclerosis, right - may be becoming visually significant.     Pseudophakia OS - mild PCO on NDFE - check OCT macula first.    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 RD precautions.    Choroidal nevus, right - flat with no SRF, no orange, no significant drusen noted    PVD (posterior vitreous detachment), unspecified laterality - RD precautions    Refractive error -  Difficulty improving vision with new MRx. RTC for OCT macula.               Records reviewed from Deepak Jimenez M.D. - I cannot read the dates?  Looks like last note from Dr. Jimenez was 4/4/16  Tn 20 OD, 21 OS at 1:40 pm  Tmax 23 OD, 36 OS  Conj - rare follicle  K - mild punctate stain I OD, mod OS; no edema; on old KP central OS, min endopigment OS  AC - clear OD; trace flare no cells OS  P - 3.0-3.5 OD, RRL; 3.5-4.0 OS with sphincterotomy I, otherwise RRL  I - no NV  PCIOL OS  DFE OD:  C:D 0.50 x 0.35, oval  Macula - pigment mottling, rare MA T macula, no edema/exudate  Rare blot heme outside macula, " few MA;s  PVD with Vizcarra ring  Nevus I 2.5 x 2.0 DD in size  Point traction at 10:45  Vit traction 10:00 mid periphery  Lattice 4:00-5:00; 5:00-5:30, 5:45-6:00; 6:00-6:30  No RT's  DFE OS:  C:D 0.55 x 0.35-0.4  Macula with shallow RPE detachments S macula, rare MA T macuula  No edema or exudates  No blot H  Lattice 10:45-11:00, 11:45-12:15, 1:30-1:45, 4:00-6:00  Point traction posterior to lattice 1:30    Prior note;  Gonio OD - grade IV open 360 to scleral spur, no PAS, iris processes greatest N, mild narrowing of approach  Gonio OS - grade IV open 360 to scleral spur except for PAS 4:00, 4:30, 5:30, 5:45 with grade II open 5:45-7:00, PAS 7:00-9:00, 9;15, 9:30, 10:00, 10:15, 1:30, 1:40, 2:00, 2:30  Mild narrowing of approach  Tn 18//20 at 1:45 pm    Dr. Cheryl Reddy 9/26/16  Ta 11//9  Conj cyst OS  K - rare PEE's OD; K scars OS, rare endopigment OS, diffuse PEE's  Nevanac decreased to TWICE DAILY, LS TWICE DAILY OS, PF AT's  Meds; Lumigain 0.03% OS, Combigan Q12h OU, Dorzolamide TID OS

## 2017-12-18 ENCOUNTER — OFFICE VISIT (OUTPATIENT)
Dept: CARDIOLOGY | Facility: CLINIC | Age: 77
End: 2017-12-18
Payer: COMMERCIAL

## 2017-12-18 VITALS
WEIGHT: 142.88 LBS | DIASTOLIC BLOOD PRESSURE: 61 MMHG | HEIGHT: 66 IN | SYSTOLIC BLOOD PRESSURE: 145 MMHG | BODY MASS INDEX: 22.96 KG/M2 | HEART RATE: 60 BPM

## 2017-12-18 DIAGNOSIS — I10 ESSENTIAL HYPERTENSION: ICD-10-CM

## 2017-12-18 DIAGNOSIS — I48.0 PAF (PAROXYSMAL ATRIAL FIBRILLATION): Primary | ICD-10-CM

## 2017-12-18 PROCEDURE — 99214 OFFICE O/P EST MOD 30 MIN: CPT | Mod: S$GLB,,, | Performed by: INTERNAL MEDICINE

## 2017-12-18 PROCEDURE — 99999 PR PBB SHADOW E&M-EST. PATIENT-LVL II: CPT | Mod: PBBFAC,,, | Performed by: INTERNAL MEDICINE

## 2017-12-18 RX ORDER — EZETIMIBE 10 MG/1
10 TABLET ORAL DAILY
Refills: 3 | COMMUNITY
Start: 2017-12-11

## 2017-12-18 RX ORDER — HYDROCHLOROTHIAZIDE 25 MG/1
25 TABLET ORAL DAILY PRN
Refills: 2 | COMMUNITY
Start: 2017-12-11 | End: 2019-01-01

## 2017-12-18 NOTE — PROGRESS NOTES
Subjective:    Patient ID:  April Lea is a 77 y.o. female who presents for follow-up of hypertension    HPI  She comes with no complaints, no chest pain, no shortness of breath  Currently taking toprol xl 50 bid    Review of Systems   Constitution: Negative for decreased appetite, weakness, malaise/fatigue, weight gain and weight loss.   Cardiovascular: Negative for chest pain, dyspnea on exertion, leg swelling, palpitations and syncope.   Respiratory: Negative for cough and shortness of breath.    Gastrointestinal: Negative.    All other systems reviewed and are negative.       Objective:    Physical Exam   Constitutional: She is oriented to person, place, and time. She appears well-developed and well-nourished.   HENT:   Head: Normocephalic.   Eyes: Pupils are equal, round, and reactive to light.   Neck: Normal range of motion. Neck supple. No JVD present. Carotid bruit is not present. No thyromegaly present.   Cardiovascular: Normal rate, regular rhythm, normal heart sounds, intact distal pulses and normal pulses.  PMI is not displaced.  Exam reveals no gallop.    No murmur heard.  Pulmonary/Chest: Effort normal and breath sounds normal.   Abdominal: Soft. Normal appearance. She exhibits no mass. There is no hepatosplenomegaly. There is no tenderness.   Musculoskeletal: Normal range of motion. She exhibits no edema.   Neurological: She is alert and oriented to person, place, and time. She has normal strength and normal reflexes. No sensory deficit.   Skin: Skin is warm and intact.   Psychiatric: She has a normal mood and affect.   Nursing note and vitals reviewed.        Assessment:       1. PAF (paroxysmal atrial fibrillation)    2. Essential hypertension         Plan:     Continue all cardiac medications  Regular exercise program  Weight loss  F/u in 6 m

## 2018-01-02 ENCOUNTER — TELEPHONE (OUTPATIENT)
Dept: OPHTHALMOLOGY | Facility: CLINIC | Age: 78
End: 2018-01-02

## 2018-01-02 NOTE — TELEPHONE ENCOUNTER
Pt unavailable, but her daughter answered. Says she has drops, not sure which ones. Will try to call back tomorrow to get it straightened out - she may be taking Combigan with Brimonidine. Daughter states she is also back on Nevanac.    ----- Message from CUONG Mckenna OD sent at 12/28/2017 11:30 AM CST -----  Regarding: FW: cosopt back order---already taking combigan?  Sorry, I realized I didn't put any changes through for this patient.    ----- Message -----  From: Malena Acevedo MD  Sent: 12/27/2017   8:32 AM  To: CUONG Mckenna OD  Subject: RE: cosopt back order---already taking combi#    She presented on Combigan with Dorzolamide. I switched her to Cosopt with Brimonidine to help with cost. If she switched back to Combigan, then she needs separate Dorzolamide like she was taking before. And she should continue her prostaglandin gtt. Thank you.      ----- Message -----  From: CUONG Mckenna OD  Sent: 12/26/2017   8:26 AM  To: Malena Acevedo MD  Subject: cosopt back order---already taking combigan?     Was going to put in for sep bottles, but looks like she's already on combigan, started Nov 2017

## 2018-01-03 ENCOUNTER — TELEPHONE (OUTPATIENT)
Dept: OPHTHALMOLOGY | Facility: CLINIC | Age: 78
End: 2018-01-03

## 2018-01-03 NOTE — TELEPHONE ENCOUNTER
----- Message from CUONG Mckenna OD sent at 12/28/2017 11:30 AM CST -----  Regarding: FW: cosopt back order---already taking combigan?  Sorry, I realized I didn't put any changes through for this patient.    ----- Message -----  From: Malena Acevedo MD  Sent: 12/27/2017   8:32 AM  To: CUONG Mckenna OD  Subject: RE: cosopt back order---already taking combi#    She presented on Combigan with Dorzolamide. I switched her to Cosopt with Brimonidine to help with cost. If she switched back to Combigan, then she needs separate Dorzolamide like she was taking before. And she should continue her prostaglandin gtt. Thank you.      ----- Message -----  From: CUONG Mckenna OD  Sent: 12/26/2017   8:26 AM  To: Malena Acevedo MD  Subject: cosopt back order---already taking combigan?     Was going to put in for sep bottles, but looks like she's already on combigan, started Nov 2017      I called and spoke to patient today, she is taking all the original drops she was on originally - Combigan, Dorzolamide, Bimatoprost and Ilevro. She will bring all drops with her to her visit next week, she has enough of this combination to last her til then.

## 2018-01-11 ENCOUNTER — OFFICE VISIT (OUTPATIENT)
Dept: OPHTHALMOLOGY | Facility: CLINIC | Age: 78
End: 2018-01-11
Payer: COMMERCIAL

## 2018-01-11 DIAGNOSIS — H35.413 LATTICE DEGENERATION OF RETINA, BILATERAL: ICD-10-CM

## 2018-01-11 DIAGNOSIS — Z96.1 PSEUDOPHAKIA: ICD-10-CM

## 2018-01-11 DIAGNOSIS — H52.7 REFRACTIVE ERROR: ICD-10-CM

## 2018-01-11 DIAGNOSIS — H40.033 ANATOMICAL NARROW ANGLE GLAUCOMA, BILATERAL: ICD-10-CM

## 2018-01-11 DIAGNOSIS — H20.12 CHRONIC ANTERIOR UVEITIS, LEFT: ICD-10-CM

## 2018-01-11 DIAGNOSIS — E10.9 TYPE 1 DIABETES MELLITUS WITHOUT RETINOPATHY: ICD-10-CM

## 2018-01-11 DIAGNOSIS — H43.819 PVD (POSTERIOR VITREOUS DETACHMENT), UNSPECIFIED LATERALITY: ICD-10-CM

## 2018-01-11 DIAGNOSIS — H25.11 NUCLEAR SCLEROSIS, RIGHT: ICD-10-CM

## 2018-01-11 DIAGNOSIS — H35.372 EPIRETINAL MEMBRANE, LEFT: Primary | ICD-10-CM

## 2018-01-11 DIAGNOSIS — D31.31 CHOROIDAL NEVUS, RIGHT: ICD-10-CM

## 2018-01-11 DIAGNOSIS — H35.723 RETINAL PIGMENT EPITHELIAL DETACHMENT OF BOTH EYES: ICD-10-CM

## 2018-01-11 PROCEDURE — 99999 PR PBB SHADOW E&M-EST. PATIENT-LVL II: CPT | Mod: PBBFAC,,, | Performed by: OPHTHALMOLOGY

## 2018-01-11 PROCEDURE — 92134 CPTRZ OPH DX IMG PST SGM RTA: CPT | Mod: S$GLB,,, | Performed by: OPHTHALMOLOGY

## 2018-01-11 PROCEDURE — 92012 INTRM OPH EXAM EST PATIENT: CPT | Mod: S$GLB,,, | Performed by: OPHTHALMOLOGY

## 2018-01-11 NOTE — PATIENT INSTRUCTIONS
What Are Cataracts?  A clear lens in the eye focuses light. This lets the eye see images sharply. With age, the lens slowly becomes cloudy. The cloudy lens is a cataract. A cataract scatters light and makes it hard for the eye to focus. Cataracts often form in both eyes. But one lens may cloud faster than the other.      The aging of your lens  Your lens may cloud so slowly that you don`t notice any vision changes at first. But as the cataract gets worse, the eye has a harder time focusing. In early stages, glasses may help you see better. As the lens gets more cloudy, your doctor may recommend surgery to restore your vision.  Date Last Reviewed: 6/14/2015  © 0781-0556 [employer]. [address] [place] [address]. All rights reserved. This information is not intended as a substitute for professional medical care. Always follow your healthcare professional's instructions.        Small-Incision Cataract Surgery: Removing the Old Lens  You may be surprised by how little time small-incision cataract surgery takes.  The procedure  Your doctor uses a microscope and tiny tools to make the incision and remove the old lens. A special tool breaks apart the old lens with sound waves (ultrasound) and then takes out the pieces. This process is called phacoemulsification. The natural membrane (capsule) that held your lens is left in place.  Incision size  A smaller incision (top) means a shorter recovery time for you. The location of the incision will vary.         Date Last Reviewed: 6/14/2015  © 8598-3922 [employer]. [address] [place] [address]. All rights reserved. This information is not intended as a substitute for professional medical care. Always follow your healthcare professional's instructions.        Small-Incision Cataract Surgery: Implanting the New Lens  Once your old lens has been removed, your doctor slips the new lens (IOL or intraocular lens) in through the  incision. The IOL is then placed in the capsule that held your old lens. With the new lens in place, your doctor is ready to close the incision. Some incisions may be closed with stitches. Others are self-sealing. That means they will stay closed on their own without stitches. The IOL does much the same thing as your old lens did before it became cloudy. It focuses light, letting you see sharp images and vivid colors. The IOL normally lasts a lifetime.  How small is an IOL?        Flexible tabs hold the IOL in place in the eye's natural capsule.     Date Last Reviewed: 6/14/2015  © 7753-1181 [employer]. [address] [place] [address], [place] [address]. All rights reserved. This information is not intended as a substitute for professional medical care. Always follow your healthcare professional's instructions.        Before Small-Incision Cataract Surgery    Like any operation, small-incision cataract surgery requires preparation.  Your health history  Your eye doctor will review your health history. Based on that, he or she may order tests or talk to your other health care providers. Tell your eye doctor which medicines you take. That includes over-the-counter medicine such as aspirin.  Your eye exam  You will have a complete eye exam. Your eye doctor or a technician will use devices that measure the length and curve of your eye. These measurements let your doctor select the proper new lens (IOL or intraocular lens) for you.  The night before surgery  Dont eat or drink anything after midnight the night before your surgery. This includes water, coffee, chewing gum, and mints. If you have been told to continue your daily medicine, take it only with small sips of water. Make sure you follow any other instructions your doctor gives you.  The day of surgery  Have someone you know drive you to and from the outpatient surgery clinic or hospital. Plan to be there for about 2 to 3 hours. When you arrive, youll sign  a consent form if you havent done so already. This form explains the risks of surgery. Just before surgery, your doctor will give you medicine that will relax you and keep you from feeling pain. You may sleep lightly.  Risks and complications  · Your doctor may have to shift from a small incision to a larger incision.  · There is a small chance of bleeding, infection, or swelling.   Date Last Reviewed: 6/14/2015 © 2000-2016 [employer]. [address] [place] [address], [place] [address]. All rights reserved. This information is not intended as a substitute for professional medical care. Always follow your healthcare professional's instructions.        After Small-Incision Cataract Surgery: The First 24 Hours    After surgery, youll rest in a recovery area for about an hour. Even though you may feel fine, you should take it easy. Your doctor will let you know what you should and shouldnt do once you get home. You may need to wear eye protection the first day. Also remember to take any eye drops or other medicine your doctor prescribes.  Back at home  Spend your first day relaxing at home. Watch TV, read, or talk to a friend. Be careful to:  · Not rub your eye  · Not lift anything that makes you strain  · Not drink alcohol within the first 24 hours  What to expect  Its normal for your eye to be bruised or bloodshot at first. You may also feel itching or mild discomfort. You may have some short-term (temporary) fluid discharge. These wont last long.   Getting back in action  You may be able to get back to much of your routine on the first day. But with some tasks, your doctor may ask you to wait. Always follow your doctor's recommendations.  Here are some general guidelines:  · You can shower again in 2 to 3 days.  · You can cook again in 2 to 3 days.  · You can drive again in 5 to 7 days.  · You can exercise again in 14 days.  When to call your doctor  Call your doctor if:  · Your pain is not relieved by  over-the-counter medicine.  · You have nausea or vomiting.  · Your vision suddenly becomes worse.   Date Last Reviewed: 6/14/2015  © 7390-8590 [employer]. [address] [place] [address], [place] [address]. All rights reserved. This information is not intended as a substitute for professional medical care. Always follow your healthcare professional's instructions.

## 2018-01-11 NOTE — PROGRESS NOTES
"HPI     Glaucoma    Additional comments: 1 month iop cjh with OCT N//  Brimoprost QHS OS,   Combigan BID OUI Dorzolamide TID OD, ilevro QD OS//           Comments   1 month iop cjh with OCT N//  Brimoprost QHS OS, Combigan BID OUI   Dorzolamide TID OD, ilevro QD OS//       Last edited by Mayuri Steele on 1/11/2018  1:23 PM. (History)            Assessment /Plan     For exam results, see Encounter Report.    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  -     Posterior Segment OCT Retina-Both eyes    Lattice degeneration of retina, bilateral    Choroidal nevus, right    PVD (posterior vitreous detachment), unspecified laterality    Refractive error            Anatomical narrow angle glaucoma, bilateral - IOP mid teens today OU on Bimatoprost, Combigan, and Dorzolamide - Rx's written for generics, but only has Brimonidine due to Cosopt being on backorder again (keeping generic bimatoprost, switching Combigan to Cosopt BID OU, switch Dorzolamide to Brimonidine TID OS) - will have her continue original regimen as she just picked up refills and cosopt stil on backorder. Last HRT appears stable/WNL OD, OS with increased thinning. Last HVF - scattered defects, "WNL". /570. Continue same gtts for now.    ERM OS - may be new. OCT done today. Re-check in 4-6 months, after CE OS. Pt also given Amsler grid to monitor at home. At this point, VA still corrects to 20/30- despite metamorphopsia. Observation recommended for now.     Chronic anterior uveitis, left - quiet today. Ilevro too expensive with her insurance - will switch back to Nevanac TID OS when Ilevro runs out.    Nuclear sclerosis, right - Likely visually significant. Unable to correct vision OD past " 20/40. Dilates well, denies flomax. Schedule CE OD. Consider using Durezol, given chronic anterior uveitis OS? She also reports being a brittle diabetic and on insulin pump, probably best to schedule her first, discuss punctal occlusion technique for steroid gtts.     Pseudophakia OS - mild PCO on NDFE. Observe for now, as also has ERM.    Type 1 diabetes mellitus without retinopathy - stable. 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 RD precautions.    Choroidal nevus, right - flat with no SRF, no orange, no significant drusen noted    PVD (posterior vitreous detachment), unspecified laterality - RD precautions    Refractive error -  Difficulty improving vision with new MRx. [employer] emmetropia.               Records reviewed from Deepak Jimenez M.D. - I cannot read the dates?  Looks like last note from Dr. Jimenez was 4/4/16  Tn 20 OD, 21 OS at 1:40 pm  Tmax 23 OD, 36 OS  Conj - rare follicle  K - mild punctate stain I OD, mod OS; no edema; on old KP central OS, min endopigment OS  AC - clear OD; trace flare no cells OS  P - 3.0-3.5 OD, RRL; 3.5-4.0 OS with sphincterotomy I, otherwise RRL  I - no NV  PCIOL OS  DFE OD:  C:D 0.50 x 0.35, oval  Macula - pigment mottling, rare MA T macula, no edema/exudate  Rare blot heme outside macula, few MA;s  PVD with Vizcarra ring  Nevus I 2.5 x 2.0 DD in size  Point traction at 10:45  Vit traction 10:00 mid periphery  Lattice 4:00-5:00; 5:00-5:30, 5:45-6:00; 6:00-6:30  No RT's  DFE OS:  C:D 0.55 x 0.35-0.4  Macula with shallow RPE detachments S macula, rare MA T macuula  No edema or exudates  No blot H  Lattice 10:45-11:00, 11:45-12:15, 1:30-1:45, 4:00-6:00  Point traction posterior to lattice 1:30    Prior note;  Gonio OD - grade IV open 360 to scleral spur, no PAS, iris processes greatest N, mild narrowing  of approach  Gonio OS - grade IV open 360 to scleral spur except for PAS 4:00, 4:30, 5:30, 5:45 with grade II open 5:45-7:00, PAS 7:00-9:00, 9;15, 9:30, 10:00, 10:15, 1:30, 1:40, 2:00, 2:30  Mild narrowing of approach  Tn 18//20 at 1:45 pm    Dr. Cheryl Reddy 9/26/16  Ta 11//9  Conj cyst OS  K - rare PEE's OD; K scars OS, rare endopigment OS, diffuse PEE's  Nevanac decreased to TWICE DAILY, LS TWICE DAILY OS, PF AT's  Meds; Lumigain 0.03% OS, Combigan Q12h OU, Dorzolamide TID OS

## 2018-01-31 ENCOUNTER — TELEPHONE (OUTPATIENT)
Dept: OPHTHALMOLOGY | Facility: CLINIC | Age: 78
End: 2018-01-31

## 2018-01-31 NOTE — TELEPHONE ENCOUNTER
----- Message from Ana Mendez sent at 1/31/2018 12:56 PM CST -----  Please call patients daughter Zoya in regards to a earlier appt on the same day for pre op on 02/02/18, [phone]

## 2018-02-02 ENCOUNTER — OFFICE VISIT (OUTPATIENT)
Dept: OPHTHALMOLOGY | Facility: CLINIC | Age: 78
End: 2018-02-02
Payer: COMMERCIAL

## 2018-02-02 DIAGNOSIS — H20.12 CHRONIC ANTERIOR UVEITIS, LEFT: ICD-10-CM

## 2018-02-02 DIAGNOSIS — H40.033 ANATOMICAL NARROW ANGLE GLAUCOMA, BILATERAL: ICD-10-CM

## 2018-02-02 DIAGNOSIS — Z96.1 PSEUDOPHAKIA: ICD-10-CM

## 2018-02-02 DIAGNOSIS — H43.819 PVD (POSTERIOR VITREOUS DETACHMENT), UNSPECIFIED LATERALITY: ICD-10-CM

## 2018-02-02 DIAGNOSIS — H35.413 LATTICE DEGENERATION OF RETINA, BILATERAL: ICD-10-CM

## 2018-02-02 DIAGNOSIS — H52.7 REFRACTIVE ERROR: ICD-10-CM

## 2018-02-02 DIAGNOSIS — D31.31 CHOROIDAL NEVUS, RIGHT: ICD-10-CM

## 2018-02-02 DIAGNOSIS — H35.372 EPIRETINAL MEMBRANE, LEFT: ICD-10-CM

## 2018-02-02 DIAGNOSIS — H25.11 NUCLEAR SCLEROSIS, RIGHT: Primary | ICD-10-CM

## 2018-02-02 DIAGNOSIS — E10.9 TYPE 1 DIABETES MELLITUS WITHOUT RETINOPATHY: ICD-10-CM

## 2018-02-02 PROCEDURE — 92012 INTRM OPH EXAM EST PATIENT: CPT | Mod: S$GLB,,, | Performed by: OPHTHALMOLOGY

## 2018-02-02 PROCEDURE — 92136 OPHTHALMIC BIOMETRY: CPT | Mod: RT,S$GLB,, | Performed by: OPHTHALMOLOGY

## 2018-02-02 PROCEDURE — 99999 PR PBB SHADOW E&M-EST. PATIENT-LVL III: CPT | Mod: PBBFAC,,, | Performed by: OPHTHALMOLOGY

## 2018-02-02 RX ORDER — PHENYLEPHRINE HYDROCHLORIDE 100 MG/ML
1 SOLUTION/ DROPS OPHTHALMIC ONCE
Status: CANCELLED | OUTPATIENT
Start: 2018-02-02 | End: 2018-02-02

## 2018-02-02 RX ORDER — LIDOCAINE HYDROCHLORIDE 40 MG/ML
1 INJECTION, SOLUTION RETROBULBAR
Status: CANCELLED | OUTPATIENT
Start: 2018-02-02

## 2018-02-02 RX ORDER — MOXIFLOXACIN 5 MG/ML
1 SOLUTION/ DROPS OPHTHALMIC
Status: CANCELLED | OUTPATIENT
Start: 2018-02-02 | End: 2018-02-02

## 2018-02-02 RX ORDER — MOXIFLOXACIN 5 MG/ML
1 SOLUTION/ DROPS OPHTHALMIC 4 TIMES DAILY
Qty: 3 ML | Refills: 0 | Status: SHIPPED | OUTPATIENT
Start: 2018-02-27 | End: 2018-04-03 | Stop reason: ALTCHOICE

## 2018-02-02 RX ORDER — PROPARACAINE HYDROCHLORIDE 5 MG/ML
1 SOLUTION/ DROPS OPHTHALMIC
Status: CANCELLED | OUTPATIENT
Start: 2018-02-02 | End: 2018-02-02

## 2018-02-02 RX ORDER — CYCLOPENTOLATE HYDROCHLORIDE 10 MG/ML
1 SOLUTION/ DROPS OPHTHALMIC
Status: CANCELLED | OUTPATIENT
Start: 2018-02-02

## 2018-02-02 RX ORDER — PHENYLEPHRINE HYDROCHLORIDE 25 MG/ML
1 SOLUTION/ DROPS OPHTHALMIC
Status: CANCELLED | OUTPATIENT
Start: 2018-02-02

## 2018-02-02 RX ORDER — LIDOCAINE HYDROCHLORIDE 40 MG/ML
1 INJECTION, SOLUTION RETROBULBAR
Status: CANCELLED | OUTPATIENT
Start: 2018-02-02 | End: 2018-02-02

## 2018-02-02 RX ORDER — TROPICAMIDE 10 MG/ML
1 SOLUTION/ DROPS OPHTHALMIC
Status: CANCELLED | OUTPATIENT
Start: 2018-02-02

## 2018-02-02 RX ORDER — DIFLUPREDNATE OPHTHALMIC 0.5 MG/ML
1 EMULSION OPHTHALMIC 4 TIMES DAILY
Qty: 5 ML | Refills: 2 | Status: SHIPPED | OUTPATIENT
Start: 2018-02-28 | End: 2018-03-30

## 2018-02-02 RX ORDER — KETOROLAC TROMETHAMINE 5 MG/ML
1 SOLUTION OPHTHALMIC 4 TIMES DAILY
Qty: 5 ML | Refills: 2 | Status: SHIPPED | OUTPATIENT
Start: 2018-02-21 | End: 2018-03-01 | Stop reason: ALTCHOICE

## 2018-02-02 RX ORDER — PROPARACAINE HYDROCHLORIDE 5 MG/ML
1 SOLUTION/ DROPS OPHTHALMIC
Status: CANCELLED | OUTPATIENT
Start: 2018-02-02

## 2018-02-02 NOTE — PROGRESS NOTES
HPI     Pre-op Exam    Additional comments: phac iol OD to be done2/28/2018//           Comments   phaco iol OD to be done2/28/2018//  On all meds lumigan OS qhs, combigan BID OU, trusopt TID OS, ilevro QD OS  Pt will start brimonidine and nevunac when other drops run out//       Last edited by Mayuri Steele on 2/2/2018  1:38 PM. 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   (History)        Assessment /Plan     For exam results, see Encounter Report.    Nuclear sclerosis, right    Anatomical narrow angle glaucoma, bilateral    Chronic anterior uveitis, left    Epiretinal membrane, left    Type 1 diabetes mellitus without retinopathy    Lattice degeneration of retina, bilateral    Choroidal nevus, right    PVD (posterior vitreous detachment), unspecified laterality    Pseudophakia    Refractive error            Anatomical narrow angle glaucoma, bilateral - IOP mid teens today OU on Bimatoprost, Combigan, and Dorzolamide - Rx's written for generics, but only has Brimonidine due to Cosopt being on backorder again (keeping generic bimatoprost, switching Combigan to Cosopt BID OU, switch Dorzolamide to Brimonidine TID OS) - will have her continue original regimen as she just picked up refills and cosopt stil on backorder. Last HRT appears stable/WNL OD, OS with increased thinning. Last HVF - scattered defects,  ""WNL". /570. Continue same gtts for now.    ERM OS - may be new. OCT done today. Re-check in 4-6 months, after CE OS. Pt also given Amsler grid to monitor at home. At this point, VA still corrects to 20/30- despite metamorphopsia. Observation recommended for now.     Chronic anterior uveitis, left - quiet today. Ilevro too expensive with her insurance - will switch back to Nevanac TID OS when Ilevro runs out.    Nuclear sclerosis, right - Likely visually significant. Unable to correct vision OD past 20/40. Dilates well, denies flomax. Pre-op done today CE OD. Consider using Durezol, given chronic anterior uveitis OS? She also reports being a brittle diabetic and on insulin pump, probably best to schedule her first, discuss punctal occlusion technique for steroid gtts.     Pseudophakia OS - mild PCO on NDFE. Observe for now, as also has ERM.    Type 1 diabetes mellitus without retinopathy - stable. 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 RD precautions.    Choroidal nevus, right - flat with no SRF, no orange, no significant drusen noted    PVD (posterior vitreous detachment), unspecified laterality - RD precautions    Refractive error -  Difficulty improving vision with new MRx. Target emmetropia.               Records reviewed from Deepak Jimenez M.D. - I cannot read the dates?  Looks like last note from Dr. Jimenez was 4/4/16  Tn 20 OD, 21 OS at 1:40 pm  Tmax 23 OD, 36 OS  Conj - rare follicle  K - mild punctate stain I OD, mod OS; no edema; on old KP central OS, min endopigment OS  AC - clear OD; trace flare no cells OS  P - 3.0-3.5 OD, RRL; 3.5-4.0 OS with sphincterotomy I, otherwise RRL  I - no NV  PCIOL OS  DFE OD:  C:D 0.50 x 0.35, oval  Macula - pigment mottling, rare MA T macula, no edema/exudate  Rare blot heme outside " macula, few MA;s  PVD with Vizcarra ring  Nevus I 2.5 x 2.0 DD in size  Point traction at 10:45  Vit traction 10:00 mid periphery  Lattice 4:00-5:00; 5:00-5:30, 5:45-6:00; 6:00-6:30  No RT's  DFE OS:  C:D 0.55 x 0.35-0.4  Macula with shallow RPE detachments S macula, rare MA T macuula  No edema or exudates  No blot H  Lattice 10:45-11:00, 11:45-12:15, 1:30-1:45, 4:00-6:00  Point traction posterior to lattice 1:30    Prior note;  Gonio OD - grade IV open 360 to scleral spur, no PAS, iris processes greatest N, mild narrowing of approach  Gonio OS - grade IV open 360 to scleral spur except for PAS 4:00, 4:30, 5:30, 5:45 with grade II open 5:45-7:00, PAS 7:00-9:00, 9;15, 9:30, 10:00, 10:15, 1:30, 1:40, 2:00, 2:30  Mild narrowing of approach  Tn 18//20 at 1:45 pm    Dr. Cheryl Reddy 9/26/16  Ta 11//9  Conj cyst OS  K - rare PEE's OD; K scars OS, rare endopigment OS, diffuse PEE's  Nevanac decreased to TWICE DAILY, LS TWICE DAILY OS, PF AT's  Meds; Lumigain 0.03% OS, Combigan Q12h OU, Dorzolamide TID OS

## 2018-02-02 NOTE — PATIENT INSTRUCTIONS
What Are Cataracts?  A clear lens in the eye focuses light. This lets the eye see images sharply. With age, the lens slowly becomes cloudy. The cloudy lens is a cataract. A cataract scatters light and makes it hard for the eye to focus. Cataracts often form in both eyes. But one lens may cloud faster than the other.      The aging of your lens  Your lens may cloud so slowly that you don`t notice any vision changes at first. But as the cataract gets worse, the eye has a harder time focusing. In early stages, glasses may help you see better. As the lens gets more cloudy, your doctor may recommend surgery to restore your vision.  Date Last Reviewed: 6/14/2015  © 7349-0989 [employer]. [address] [place] [address]. All rights reserved. This information is not intended as a substitute for professional medical care. Always follow your healthcare professional's instructions.        Small-Incision Cataract Surgery: Removing the Old Lens  You may be surprised by how little time small-incision cataract surgery takes.  The procedure  Your doctor uses a microscope and tiny tools to make the incision and remove the old lens. A special tool breaks apart the old lens with sound waves (ultrasound) and then takes out the pieces. This process is called phacoemulsification. The natural membrane (capsule) that held your lens is left in place.  Incision size  A smaller incision (top) means a shorter recovery time for you. The location of the incision will vary.         Date Last Reviewed: 6/14/2015  © 0978-1654 [employer]. [address] [place] [address]. All rights reserved. This information is not intended as a substitute for professional medical care. Always follow your healthcare professional's instructions.        Small-Incision Cataract Surgery: Implanting the New Lens  Once your old lens has been removed, your doctor slips the new lens (IOL or intraocular lens) in through the  incision. The IOL is then placed in the capsule that held your old lens. With the new lens in place, your doctor is ready to close the incision. Some incisions may be closed with stitches. Others are self-sealing. That means they will stay closed on their own without stitches. The IOL does much the same thing as your old lens did before it became cloudy. It focuses light, letting you see sharp images and vivid colors. The IOL normally lasts a lifetime.  How small is an IOL?        Flexible tabs hold the IOL in place in the eye's natural capsule.     Date Last Reviewed: 6/14/2015  © 7167-6186 [employer]. [address] [place] [address], [place] [address]. All rights reserved. This information is not intended as a substitute for professional medical care. Always follow your healthcare professional's instructions.        Before Small-Incision Cataract Surgery    Like any operation, small-incision cataract surgery requires preparation.  Your health history  Your eye doctor will review your health history. Based on that, he or she may order tests or talk to your other health care providers. Tell your eye doctor which medicines you take. That includes over-the-counter medicine such as aspirin.  Your eye exam  You will have a complete eye exam. Your eye doctor or a technician will use devices that measure the length and curve of your eye. These measurements let your doctor select the proper new lens (IOL or intraocular lens) for you.  The night before surgery  Dont eat or drink anything after midnight the night before your surgery. This includes water, coffee, chewing gum, and mints. If you have been told to continue your daily medicine, take it only with small sips of water. Make sure you follow any other instructions your doctor gives you.  The day of surgery  Have someone you know drive you to and from the outpatient surgery clinic or hospital. Plan to be there for about 2 to 3 hours. When you arrive, youll sign  a consent form if you havent done so already. This form explains the risks of surgery. Just before surgery, your doctor will give you medicine that will relax you and keep you from feeling pain. You may sleep lightly.  Risks and complications  · Your doctor may have to shift from a small incision to a larger incision.  · There is a small chance of bleeding, infection, or swelling.   Date Last Reviewed: 6/14/2015 © 2000-2016 [employer]. [address] [place] [address], [place] [address]. All rights reserved. This information is not intended as a substitute for professional medical care. Always follow your healthcare professional's instructions.        After Small-Incision Cataract Surgery: The First 24 Hours    After surgery, youll rest in a recovery area for about an hour. Even though you may feel fine, you should take it easy. Your doctor will let you know what you should and shouldnt do once you get home. You may need to wear eye protection the first day. Also remember to take any eye drops or other medicine your doctor prescribes.  Back at home  Spend your first day relaxing at home. Watch TV, read, or talk to a friend. Be careful to:  · Not rub your eye  · Not lift anything that makes you strain  · Not drink alcohol within the first 24 hours  What to expect  Its normal for your eye to be bruised or bloodshot at first. You may also feel itching or mild discomfort. You may have some short-term (temporary) fluid discharge. These wont last long.   Getting back in action  You may be able to get back to much of your routine on the first day. But with some tasks, your doctor may ask you to wait. Always follow your doctor's recommendations.  Here are some general guidelines:  · You can shower again in 2 to 3 days.  · You can cook again in 2 to 3 days.  · You can drive again in 5 to 7 days.  · You can exercise again in 14 days.  When to call your doctor  Call your doctor if:  · Your pain is not relieved by  over-the-counter medicine.  · You have nausea or vomiting.  · Your vision suddenly becomes worse.   Date Last Reviewed: 6/14/2015  © 5718-9193 [employer]. [address] [place] [address], [place] [address]. All rights reserved. This information is not intended as a substitute for professional medical care. Always follow your healthcare professional's instructions.

## 2018-02-05 ENCOUNTER — CLINICAL SUPPORT (OUTPATIENT)
Dept: CARDIOLOGY | Facility: CLINIC | Age: 78
End: 2018-02-05
Attending: INTERNAL MEDICINE
Payer: COMMERCIAL

## 2018-02-05 DIAGNOSIS — I49.5 SICK SINUS SYNDROME: ICD-10-CM

## 2018-02-05 DIAGNOSIS — Z95.0 CARDIAC PACEMAKER IN SITU: ICD-10-CM

## 2018-02-05 PROCEDURE — 93294 REM INTERROG EVL PM/LDLS PM: CPT | Mod: S$GLB,,, | Performed by: INTERNAL MEDICINE

## 2018-02-05 PROCEDURE — 93296 REM INTERROG EVL PM/IDS: CPT | Mod: S$GLB,,, | Performed by: INTERNAL MEDICINE

## 2018-02-15 ENCOUNTER — TELEPHONE (OUTPATIENT)
Dept: OPHTHALMOLOGY | Facility: CLINIC | Age: 78
End: 2018-02-15

## 2018-02-15 NOTE — TELEPHONE ENCOUNTER
----- Message from Jaylyn Barrios sent at 2/15/2018 12:01 PM CST -----  Contact: rita  Rita Richaux patient's daughter requesting a call back at [phone].someone call patient and she is returning the call. Thanks,

## 2018-02-21 ENCOUNTER — TELEPHONE (OUTPATIENT)
Dept: OPHTHALMOLOGY | Facility: CLINIC | Age: 78
End: 2018-02-21

## 2018-02-23 ENCOUNTER — TELEPHONE (OUTPATIENT)
Dept: OPHTHALMOLOGY | Facility: CLINIC | Age: 78
End: 2018-02-23

## 2018-02-23 NOTE — TELEPHONE ENCOUNTER
Called Dr. Perdue and informed him that Dr. Acevedo is out of clinic until Monday-Dr. Perdue wanted to confirm that Dr. Acevedo was aware that pt was on Xarelto before her sx on Wednesday.    ----- Message from Mayuri Gonzalez sent at 2/23/2018  8:33 AM CST -----  Contact: [facility]   Dr Perdue would like to speak to Dr Acevedo about patient     Please call [phone]   Please state you are calling for Dr Perdue  and they will pull out of room

## 2018-02-26 ENCOUNTER — TELEPHONE (OUTPATIENT)
Dept: OPHTHALMOLOGY | Facility: CLINIC | Age: 78
End: 2018-02-26

## 2018-02-26 NOTE — TELEPHONE ENCOUNTER
----- Message from Malena Acevedo MD sent at 2/26/2018  1:41 PM CST -----  OK to stay on Xarelto for cataract surgery    ----- Message -----  From: Curry E Book  Sent: 2/26/2018   1:07 PM  To: Malena Acevedo MD     I got a message while you were out Friday about this patient---Dr. Perdue called and wanted to confirm that Dr. Acevedo was aware that pt was on Xarelto before her sx on Wednesday. He wanted to make sure it wouldn't cause any complications. Thanks

## 2018-02-27 ENCOUNTER — ANESTHESIA EVENT (OUTPATIENT)
Dept: SURGERY | Facility: HOSPITAL | Age: 78
End: 2018-02-27
Payer: COMMERCIAL

## 2018-02-27 RX ORDER — DORZOLAMIDE HYDROCHLORIDE AND TIMOLOL MALEATE 20; 5 MG/ML; MG/ML
1 SOLUTION/ DROPS OPHTHALMIC 3 TIMES DAILY
COMMUNITY
End: 2018-03-01 | Stop reason: ALTCHOICE

## 2018-02-28 ENCOUNTER — SURGERY (OUTPATIENT)
Age: 78
End: 2018-02-28

## 2018-02-28 ENCOUNTER — ANESTHESIA (OUTPATIENT)
Dept: SURGERY | Facility: HOSPITAL | Age: 78
End: 2018-02-28
Payer: COMMERCIAL

## 2018-02-28 ENCOUNTER — HOSPITAL ENCOUNTER (OUTPATIENT)
Facility: HOSPITAL | Age: 78
Discharge: HOME | End: 2018-02-28
Attending: OPHTHALMOLOGY | Admitting: OPHTHALMOLOGY
Payer: COMMERCIAL

## 2018-02-28 VITALS
HEIGHT: 66 IN | HEART RATE: 60 BPM | RESPIRATION RATE: 18 BRPM | WEIGHT: 145 LBS | OXYGEN SATURATION: 99 % | TEMPERATURE: 98 F | SYSTOLIC BLOOD PRESSURE: 186 MMHG | DIASTOLIC BLOOD PRESSURE: 77 MMHG | BODY MASS INDEX: 23.3 KG/M2

## 2018-02-28 DIAGNOSIS — H25.11 NUCLEAR SCLEROSIS, RIGHT: ICD-10-CM

## 2018-02-28 DIAGNOSIS — E10.59 TYPE 1 DIABETES MELLITUS WITH OTHER CIRCULATORY COMPLICATION: Primary | ICD-10-CM

## 2018-02-28 LAB — GLUCOSE SERPL-MCNC: 187 MG/DL (ref 70–110)

## 2018-02-28 PROCEDURE — V2632 POST CHMBR INTRAOCULAR LENS: HCPCS | Mod: PO | Performed by: OPHTHALMOLOGY

## 2018-02-28 PROCEDURE — 36000706: Mod: PO | Performed by: OPHTHALMOLOGY

## 2018-02-28 PROCEDURE — 37000008 HC ANESTHESIA 1ST 15 MINUTES: Mod: PO | Performed by: OPHTHALMOLOGY

## 2018-02-28 PROCEDURE — 25000003 PHARM REV CODE 250: Mod: PO | Performed by: OPHTHALMOLOGY

## 2018-02-28 PROCEDURE — 36000707: Mod: PO | Performed by: OPHTHALMOLOGY

## 2018-02-28 PROCEDURE — 71000033 HC RECOVERY, INTIAL HOUR: Mod: PO | Performed by: OPHTHALMOLOGY

## 2018-02-28 PROCEDURE — D9220A PRA ANESTHESIA: Mod: CRNA,,, | Performed by: NURSE ANESTHETIST, CERTIFIED REGISTERED

## 2018-02-28 PROCEDURE — D9220A PRA ANESTHESIA: Mod: ANES,,, | Performed by: ANESTHESIOLOGY

## 2018-02-28 PROCEDURE — 66984 XCAPSL CTRC RMVL W/O ECP: CPT | Mod: RT,,, | Performed by: OPHTHALMOLOGY

## 2018-02-28 PROCEDURE — 82962 GLUCOSE BLOOD TEST: CPT | Mod: PO | Performed by: OPHTHALMOLOGY

## 2018-02-28 PROCEDURE — 37000009 HC ANESTHESIA EA ADD 15 MINS: Mod: PO | Performed by: OPHTHALMOLOGY

## 2018-02-28 PROCEDURE — 63600175 PHARM REV CODE 636 W HCPCS: Mod: PO | Performed by: NURSE ANESTHETIST, CERTIFIED REGISTERED

## 2018-02-28 PROCEDURE — 63600175 PHARM REV CODE 636 W HCPCS: Mod: PO | Performed by: OPHTHALMOLOGY

## 2018-02-28 DEVICE — LENS 21.0 ACRYSOF: Type: IMPLANTABLE DEVICE | Site: LENS | Status: FUNCTIONAL

## 2018-02-28 RX ORDER — EPINEPHRINE 1 MG/ML
INJECTION INTRAMUSCULAR; INTRAVENOUS; SUBCUTANEOUS
Status: DISCONTINUED | OUTPATIENT
Start: 2018-02-28 | End: 2018-02-28 | Stop reason: HOSPADM

## 2018-02-28 RX ORDER — PROPARACAINE HYDROCHLORIDE 5 MG/ML
1 SOLUTION/ DROPS OPHTHALMIC
Status: DISCONTINUED | OUTPATIENT
Start: 2018-02-28 | End: 2018-02-28 | Stop reason: HOSPADM

## 2018-02-28 RX ORDER — LIDOCAINE HYDROCHLORIDE 10 MG/ML
1 INJECTION, SOLUTION EPIDURAL; INFILTRATION; INTRACAUDAL; PERINEURAL ONCE
Status: DISCONTINUED | OUTPATIENT
Start: 2018-02-28 | End: 2018-02-28 | Stop reason: HOSPADM

## 2018-02-28 RX ORDER — MIDAZOLAM HYDROCHLORIDE 1 MG/ML
INJECTION INTRAMUSCULAR; INTRAVENOUS
Status: DISCONTINUED | OUTPATIENT
Start: 2018-02-28 | End: 2018-02-28

## 2018-02-28 RX ORDER — SODIUM CHLORIDE, SODIUM LACTATE, POTASSIUM CHLORIDE, CALCIUM CHLORIDE 600; 310; 30; 20 MG/100ML; MG/100ML; MG/100ML; MG/100ML
INJECTION, SOLUTION INTRAVENOUS CONTINUOUS
Status: DISCONTINUED | OUTPATIENT
Start: 2018-02-28 | End: 2018-02-28 | Stop reason: HOSPADM

## 2018-02-28 RX ORDER — PROPARACAINE HYDROCHLORIDE 5 MG/ML
1 SOLUTION/ DROPS OPHTHALMIC
Status: COMPLETED | OUTPATIENT
Start: 2018-02-28 | End: 2018-02-28

## 2018-02-28 RX ORDER — MOXIFLOXACIN 5 MG/ML
1 SOLUTION/ DROPS OPHTHALMIC
Status: COMPLETED | OUTPATIENT
Start: 2018-02-28 | End: 2018-02-28

## 2018-02-28 RX ORDER — SODIUM CHLORIDE, SODIUM LACTATE, POTASSIUM CHLORIDE, CALCIUM CHLORIDE 600; 310; 30; 20 MG/100ML; MG/100ML; MG/100ML; MG/100ML
1000 INJECTION, SOLUTION INTRAVENOUS ONCE
Status: CANCELLED | OUTPATIENT
Start: 2018-02-28 | End: 2018-02-28

## 2018-02-28 RX ORDER — CYCLOPENTOLATE HYDROCHLORIDE 10 MG/ML
1 SOLUTION/ DROPS OPHTHALMIC
Status: DISCONTINUED | OUTPATIENT
Start: 2018-02-28 | End: 2018-02-28 | Stop reason: HOSPADM

## 2018-02-28 RX ORDER — TROPICAMIDE 10 MG/ML
1 SOLUTION/ DROPS OPHTHALMIC
Status: DISCONTINUED | OUTPATIENT
Start: 2018-02-28 | End: 2018-02-28 | Stop reason: HOSPADM

## 2018-02-28 RX ORDER — MOXIFLOXACIN 5 MG/ML
SOLUTION/ DROPS OPHTHALMIC
Status: DISCONTINUED | OUTPATIENT
Start: 2018-02-28 | End: 2018-02-28 | Stop reason: HOSPADM

## 2018-02-28 RX ORDER — ONDANSETRON 2 MG/ML
4 INJECTION INTRAMUSCULAR; INTRAVENOUS ONCE AS NEEDED
Status: CANCELLED | OUTPATIENT
Start: 2018-02-28 | End: 2018-02-28

## 2018-02-28 RX ORDER — PHENYLEPHRINE HYDROCHLORIDE 100 MG/ML
1 SOLUTION/ DROPS OPHTHALMIC ONCE
Status: COMPLETED | OUTPATIENT
Start: 2018-02-28 | End: 2018-02-28

## 2018-02-28 RX ORDER — FENTANYL CITRATE 50 UG/ML
25 INJECTION, SOLUTION INTRAMUSCULAR; INTRAVENOUS EVERY 5 MIN PRN
Status: CANCELLED | OUTPATIENT
Start: 2018-02-28

## 2018-02-28 RX ORDER — ONDANSETRON HYDROCHLORIDE 2 MG/ML
INJECTION, SOLUTION INTRAMUSCULAR; INTRAVENOUS
Status: DISCONTINUED | OUTPATIENT
Start: 2018-02-28 | End: 2018-02-28

## 2018-02-28 RX ORDER — LIDOCAINE HYDROCHLORIDE 40 MG/ML
1 INJECTION, SOLUTION RETROBULBAR
Status: COMPLETED | OUTPATIENT
Start: 2018-02-28 | End: 2018-02-28

## 2018-02-28 RX ORDER — PHENYLEPHRINE HYDROCHLORIDE 25 MG/ML
1 SOLUTION/ DROPS OPHTHALMIC
Status: DISCONTINUED | OUTPATIENT
Start: 2018-02-28 | End: 2018-02-28 | Stop reason: HOSPADM

## 2018-02-28 RX ORDER — ACETAMINOPHEN 325 MG/1
650 TABLET ORAL EVERY 6 HOURS PRN
Status: CANCELLED | OUTPATIENT
Start: 2018-02-28

## 2018-02-28 RX ADMIN — TROPICAMIDE 1 DROP: 10 SOLUTION/ DROPS OPHTHALMIC at 06:02

## 2018-02-28 RX ADMIN — EPINEPHRINE 0.3 MG: 1 INJECTION, SOLUTION INTRAMUSCULAR; INTRAVENOUS; SUBCUTANEOUS at 07:02

## 2018-02-28 RX ADMIN — PHENYLEPHRINE HYDROCHLORIDE 1 DROP: 25 SOLUTION/ DROPS OPHTHALMIC at 06:02

## 2018-02-28 RX ADMIN — LIDOCAINE HYDROCHLORIDE 4 MG: 40 INJECTION, SOLUTION RETROBULBAR; TOPICAL at 07:02

## 2018-02-28 RX ADMIN — Medication 0.5 ML: at 07:02

## 2018-02-28 RX ADMIN — ONDANSETRON 4 MG: 2 INJECTION, SOLUTION INTRAMUSCULAR; INTRAVENOUS at 07:02

## 2018-02-28 RX ADMIN — LIDOCAINE HYDROCHLORIDE 4 MG: 40 INJECTION, SOLUTION RETROBULBAR; TOPICAL at 06:02

## 2018-02-28 RX ADMIN — CYCLOPENTOLATE HYDROCHLORIDE 1 DROP: 10 SOLUTION/ DROPS OPHTHALMIC at 06:02

## 2018-02-28 RX ADMIN — BALANCED SALT SOLUTION 15 ML: 6.4; .75; .48; .3; 3.9; 1.7 SOLUTION OPHTHALMIC at 07:02

## 2018-02-28 RX ADMIN — SODIUM CHLORIDE, SODIUM LACTATE, POTASSIUM CHLORIDE, CALCIUM CHLORIDE: 600; 310; 30; 20 INJECTION, SOLUTION INTRAVENOUS at 07:02

## 2018-02-28 RX ADMIN — MIDAZOLAM HYDROCHLORIDE 1 MG: 1 INJECTION, SOLUTION INTRAMUSCULAR; INTRAVENOUS at 07:02

## 2018-02-28 RX ADMIN — SODIUM HYALURONATE 0.85 MG: 10 INJECTION INTRAOCULAR at 07:02

## 2018-02-28 RX ADMIN — PROPARACAINE HYDROCHLORIDE 1 DROP: 5 SOLUTION/ DROPS OPHTHALMIC at 07:02

## 2018-02-28 RX ADMIN — PROPARACAINE HYDROCHLORIDE 1 DROP: 5 SOLUTION/ DROPS OPHTHALMIC at 06:02

## 2018-02-28 RX ADMIN — PHENYLEPHRINE HYDROCHLORIDE 1 DROP: 100 SOLUTION/ DROPS OPHTHALMIC at 06:02

## 2018-02-28 RX ADMIN — MOXIFLOXACIN HYDROCHLORIDE 1 DROP: 5 SOLUTION/ DROPS OPHTHALMIC at 06:02

## 2018-02-28 RX ADMIN — MOXIFLOXACIN HYDROCHLORIDE 1 DROP: 5 SOLUTION/ DROPS OPHTHALMIC at 07:02

## 2018-02-28 NOTE — DISCHARGE INSTRUCTIONS
Printed instructions given per Dr. Acevedo      Recovery After Procedural Sedation (Adult)  You have been given medicine by vein to make you sleep during your surgery. This may have included both a pain medicine and sleeping medicine. Most of the effects have worn off. But you may still have some drowsiness for the next 6 to 8 hours.  Home care  Follow these guidelines when you get home:  · For the next 8 hours, you should be watched by a responsible adult. This person should make sure your condition is not getting worse.  · Don't drink any alcohol for the next 24 hours.  · Don't drive, operate dangerous machinery, or make important business or personal decisions during the next 24 hours.  Note: Your healthcare provider may tell you not to take any medicine by mouth for pain or sleep in the next 4 hours. These medicines may react with the medicines you were given in the hospital. This could cause a much stronger response than usual.  Follow-up care  Follow up with your healthcare provider if you are not alert and back to your usual level of activity within 12 hours.  When to seek medical advice  Call your healthcare provider right away if any of these occur:  · Drowsiness gets worse  · Weakness or dizziness gets worse  · Repeated vomiting  · You can't be awakened   Date Last Reviewed: 10/18/2016  © 3665-0122 [employer]. [address] [place] [address], [place] [address]. All rights reserved. This information is not intended as a substitute for professional medical care. Always follow your healthcare professional's instructions.

## 2018-02-28 NOTE — BRIEF OP NOTE
Operative Note     SUMMARY     Surgery Date: 2/28/2018     Surgeon(s) and Role:     * Malena Acevedo MD - Primary    Pre-op Diagnosis:  Nuclear sclerosis, right [H25.11]    Post-op Diagnosis:  Nuclear sclerosis, right [H25.11]    Procedure(s) (LRB):  phaco/PCIOL (Right)    Anesthesia: Local MAC    Findings/Key Components:  Same as post op diagnosis    Estimated Blood Loss: * No values recorded between 2/28/2018  7:18 AM and 2/28/2018  7:52 AM *         Specimens     None            Discharge Note      SUMMARY     Admit Date: 2/28/2018    Attending Physician: Malena Acevedo*     Discharge Physician: Malena Acevedo*    Discharge Date: 2/28/2018     Final Diagnosis: Nuclear sclerosis, right [H25.11]    Hospital Course: The patient was admitted for outpatient surgery and tolerated the procedure well. The patient was discharged home in stable condition the same day.    Diet: Advance as tolerated    Follow-Up: Follow up with Dr. Acevedo, next day, as previously scheduled  Activity as tolerated.      Activity: Per Handout Instructions    Disposition: Home or self care    Patient Instructions:   Current Discharge Medication List      CONTINUE these medications which have NOT CHANGED    Details   bimatoprost (LUMIGAN) 0.03 % ophthalmic drops Place 1 drop into the left eye every evening.  Qty: 3 mL, Refills: 11      busPIRone (BUSPAR) 10 MG tablet Take 1 tablet (10 mg total) by mouth 2 (two) times daily.  Qty: 60 tablet, Refills: 0      COMBIGAN 0.2-0.5 % Drop INT 1 GTT IN OU BID  Refills: 11      cyanocobalamin (VITAMIN B-12) 1000 MCG tablet Take 1 tablet (1,000 mcg total) by mouth once daily.      dorzolamide-timolol 2-0.5% (COSOPT) 22.3-6.8 mg/mL ophthalmic solution Place 1 drop into the left eye 3 (three) times daily.      ezetimibe (ZETIA) 10 mg tablet TK 1 T PO QD  Refills: 3      ferrous sulfate 324 mg (65 mg iron) TbEC Take 325 mg by mouth once daily.      hydroCHLOROthiazide  (HYDRODIURIL) 25 MG tablet TK 1 T PO  QD PRF ELEVATED BLOOD PREESURE  Refills: 2      ILEVRO 0.3 % DrpS INT 1 DROP LEFT EYE QD  Refills: 11      insulin lispro (HUMALOG) 100 unit/mL injection Inject into the skin 3 (three) times daily before meals. Insulin pump      ketorolac 0.5% (ACULAR) 0.5 % Drop Place 1 drop into the right eye 4 (four) times daily. Start 1 week before surgery.  Qty: 5 mL, Refills: 2      levothyroxine (SYNTHROID) 100 MCG tablet Take 100 mcg by mouth once daily.      metoprolol succinate (TOPROL-XL) 50 MG 24 hr tablet TK 1 T PO bid  Refills: 2      moxifloxacin (VIGAMOX) 0.5 % ophthalmic solution Place 1 drop into the right eye 4 (four) times daily. Start 1 day before cataract surgery.  Qty: 3 mL, Refills: 0      pravastatin (PRAVACHOL) 80 MG tablet Take 1 tablet (80 mg total) by mouth once daily.  Qty: 30 tablet, Refills: 0      rivaroxaban (XARELTO) 20 mg Tab Take 1 tablet (20 mg total) by mouth daily with dinner or evening meal.  Qty: 90 tablet, Refills: 3      vitamin D 1000 units Tab Take 185 mg by mouth once daily.      difluprednate (DUREZOL) 0.05 % Drop ophthalmic solution Place 1 drop into the right eye 4 (four) times daily. Start on day of surgery.  Qty: 5 mL, Refills: 2             Discharge Procedure Orders (must include Diet, Follow-up, Activity)  No discharge procedures on file.

## 2018-02-28 NOTE — PLAN OF CARE
Patient tolerating oral liquids without difficulty. No apparent s&s of distress noted at this time, no complaints voiced at this time. Eye patch in place to right eye Discharge instructions reviewed with patient/family/friend with good verbal feedback received. Patient ready for discharge

## 2018-02-28 NOTE — OP NOTE
Date of Surgery: 2/28/2018    Operative Report     Preoperative Diagnosis: Nuclear sclerosis, right eye    Postoperative Diagnosis: Nuclear sclerosis, right eye    Procedure: Phacoemulsification with posterior chamber intraocular lens, right eye    Surgeon: Malena Acevedo M.D.    Anesthesia: MAC with topical    Procedure in detail:   Informed consent was obtained. Indications, risks and alternatives to the procedure were explained to the patient. The patient was given the opportunity to ask questions and consented to the procedure in writing. In the preoperative holding area, the patients identity and operative site were confirmed.  Topical anesthetic was administered, consisting of alternating 0.5% Proparacaine and 4% preservative-free Lidocaine Q 5 minutes times 3.  The patient was taken to the operative suite.  A time-out was performed.  The right eye was prepped with 5% Betadine and draped in sterile fashion.  A lid speculum was placed.  A paracentesis was made at the 10 oclock position. 1% preservative-free lidocaine was placed in the anterior chamber, followed by Viscoat.  A bi-planar clear corneal incision was made at the 8 oclock position.  The cystotome was used to begin the continuous curvilinear capsulorrhexis, which was completed with the Utrata forceps.  BSS on a blunt-tipped cannula was used to hydrodissect and hydrodelineate the lens nucleus until it was noted to rotate freely.  Phacoemulsification was used in a divide-and-conquer technique to remove the lens nucleus, followed by irrigation and aspiration of the lens cortex.  The capsular bag was inflated with Healon.  The lens, which was an Mac Acry[employer] IQ IOL, model SN60WF, power 21.0, was placed in the capsular bag and rotated into position.  The remaining viscoelastic material was removed by I&A.  The wound and paracentesis were hydrated.  The lens was centered.  The anterior chamber was deep.  The eye pressure was good. A 10-0 nylon suture  was placed at the incision site.  The wounds were checked and watertight.  The lid speculum and drape were removed.  A drop of Vigamox was placed in the eye.  A clear shield was taped in place over the eye.    TOTAL ULTRASOUND TIME:  1:06.7    PERCENTAGE OF TIME IN POSITION 3: 16.0 %    Estimated blood loss:  none    Specimens:   none  Complications:  none    Disposition:   stable to PACU

## 2018-02-28 NOTE — PLAN OF CARE
VSS, BP elevated but within ordered parameters for eye drops.  and Dr. Tijerina notified. Ordered OK to leave insulin pump on. all questions answered. Denies recent fever or illness. Pt states ready for procedure.

## 2018-02-28 NOTE — ANESTHESIA PREPROCEDURE EVALUATION
02/28/2018  April Lea is a 77 y.o., female.    Anesthesia Evaluation    I have reviewed the Patient Summary Reports.    I have reviewed the Nursing Notes.   I have reviewed the Medications.     Review of Systems  Anesthesia Hx:  No problems with previous Anesthesia    Social:  Non-Smoker    Hematology/Oncology:  Hematology Normal   Oncology Normal     EENT/Dental:   Lower bridge   Cardiovascular:   Pacemaker Hypertension Dysrhythmias atrial fibrillation    Pulmonary:  Pulmonary Normal    Renal/:  Renal/ Normal     Neurological:   CVA Neuromuscular Disease, Headaches    Endocrine:   Diabetes, type 1 Hypothyroidism    Dermatological:  Skin Normal    Psych:   Psychiatric History          Physical Exam  General:  Well nourished    Airway/Jaw/Neck:  Airway Findings: Mouth Opening: Normal Tongue: Normal  General Airway Assessment: Adult  Mallampati: I  TM Distance: Normal, at least 6 cm        Eyes/Ears/Nose:  EYES/EARS/NOSE FINDINGS: Normal   Dental:  Lower bridge    Chest/Lungs:  Chest/Lungs Findings: Clear to auscultation, Normal Respiratory Rate     Heart/Vascular:  Heart Findings: Rate: Normal  Rhythm: Regular Rhythm  Sounds: Normal        Mental Status:  Mental Status Findings:  Cooperative, Alert and Oriented         Anesthesia Plan  Type of Anesthesia, risks & benefits discussed:  Anesthesia Type:  MAC  Patient's Preference:   Intra-op Monitoring Plan: standard ASA monitors  Intra-op Monitoring Plan Comments:   Post Op Pain Control Plan: IV/PO Opioids PRN  Post Op Pain Control Plan Comments:   Induction:   IV  Beta Blocker:  Patient is on a Beta-Blocker and has received one dose within the past 24 hours (No further documentation required).       Informed Consent: Patient understands risks and agrees with Anesthesia plan.  Questions answered. Anesthesia consent signed with patient.  ASA Score: 3      Day of Surgery Review of History & Physical: I have interviewed and examined the patient. I have reviewed the patient's H&P dated:  There are no significant changes.  H&P update referred to the surgeon.         Ready For Surgery From Anesthesia Perspective.

## 2018-02-28 NOTE — TRANSFER OF CARE
"Anesthesia Transfer of Care Note    Patient: April Lea    Procedure(s) Performed: Procedure(s) (LRB):  phaco/PCIOL (Right)    Patient location: PACU    Anesthesia Type: MAC    Transport from OR: Transported from OR on room air with adequate spontaneous ventilation    Post pain: adequate analgesia    Post assessment: no apparent anesthetic complications    Post vital signs: stable    Level of consciousness: awake, alert and oriented    Nausea/Vomiting: no nausea/vomiting    Complications: none    Transfer of care protocol was followed      Last vitals:   Visit Vitals  BP (!) 178/79 (BP Location: Right arm, Patient Position: Lying)   Pulse 69   Temp 36.6 °C (97.9 °F) (Skin)   Resp 16   Ht 5' 6" (1.676 m)   Wt 65.8 kg (145 lb)   SpO2 96%   Breastfeeding? No   BMI 23.40 kg/m²     "

## 2018-02-28 NOTE — INTERVAL H&P NOTE
"See PCP H&P from Dr. Perdue, dated 2/23/18 - should be scanned to epic. No changes noted. Heart and lungs per anesthesia. "This patient has been cleared for surgery in an ambulatory surgery center/facility."    Vitals:    02/28/18 0629   BP: (!) 178/79   Pulse: 69   Resp: 16   Temp: 97.9 °F (36.6 °C)       "

## 2018-02-28 NOTE — ANESTHESIA POSTPROCEDURE EVALUATION
"Anesthesia Post Evaluation    Patient: April Lea    Procedure(s) Performed: Procedure(s) (LRB):  phaco/PCIOL (Right)    Final Anesthesia Type: MAC  Patient location during evaluation: PACU  Patient participation: Yes- Able to Participate  Level of consciousness: awake and alert  Post-procedure vital signs: reviewed and stable  Pain management: adequate  Airway patency: patent  PONV status at discharge: No PONV  Anesthetic complications: no      Cardiovascular status: hemodynamically stable  Respiratory status: unassisted and room air  Hydration status: euvolemic  Follow-up not needed.        Visit Vitals  BP (!) 186/77   Pulse 60   Temp 36.6 °C (97.9 °F) (Skin)   Resp 18   Ht 5' 6" (1.676 m)   Wt 65.8 kg (145 lb)   SpO2 99%   Breastfeeding? No   BMI 23.40 kg/m²       Pain/Shital Score: Pain Assessment Performed: Yes (2/28/2018  8:20 AM)  Presence of Pain: denies (2/28/2018  8:20 AM)  Shital Score: 9 (2/28/2018  7:55 AM)      "

## 2018-03-01 ENCOUNTER — OFFICE VISIT (OUTPATIENT)
Dept: OPHTHALMOLOGY | Facility: CLINIC | Age: 78
End: 2018-03-01
Payer: COMMERCIAL

## 2018-03-01 ENCOUNTER — TELEPHONE (OUTPATIENT)
Dept: OPHTHALMOLOGY | Facility: CLINIC | Age: 78
End: 2018-03-01

## 2018-03-01 DIAGNOSIS — H35.413 LATTICE DEGENERATION OF RETINA, BILATERAL: ICD-10-CM

## 2018-03-01 DIAGNOSIS — H43.819 PVD (POSTERIOR VITREOUS DETACHMENT), UNSPECIFIED LATERALITY: ICD-10-CM

## 2018-03-01 DIAGNOSIS — H52.7 REFRACTIVE ERROR: ICD-10-CM

## 2018-03-01 DIAGNOSIS — Z98.890 POST-OPERATIVE STATE: Primary | ICD-10-CM

## 2018-03-01 DIAGNOSIS — D31.31 CHOROIDAL NEVUS, RIGHT: ICD-10-CM

## 2018-03-01 DIAGNOSIS — E10.9 TYPE 1 DIABETES MELLITUS WITHOUT RETINOPATHY: ICD-10-CM

## 2018-03-01 DIAGNOSIS — H20.12 CHRONIC ANTERIOR UVEITIS, LEFT: ICD-10-CM

## 2018-03-01 DIAGNOSIS — Z96.1 PSEUDOPHAKIA, BOTH EYES: ICD-10-CM

## 2018-03-01 DIAGNOSIS — H40.033 ANATOMICAL NARROW ANGLE GLAUCOMA, BILATERAL: ICD-10-CM

## 2018-03-01 DIAGNOSIS — H35.372 EPIRETINAL MEMBRANE, LEFT: ICD-10-CM

## 2018-03-01 PROBLEM — H25.11 NUCLEAR SCLEROSIS, RIGHT: Status: RESOLVED | Noted: 2018-02-28 | Resolved: 2018-03-01

## 2018-03-01 PROCEDURE — 99999 PR PBB SHADOW E&M-EST. PATIENT-LVL III: CPT | Mod: PBBFAC,,, | Performed by: OPHTHALMOLOGY

## 2018-03-01 PROCEDURE — 99024 POSTOP FOLLOW-UP VISIT: CPT | Mod: S$GLB,,, | Performed by: OPHTHALMOLOGY

## 2018-03-01 RX ORDER — DORZOLAMIDE HCL 20 MG/ML
1 SOLUTION/ DROPS OPHTHALMIC 3 TIMES DAILY
Qty: 10 ML | Refills: 5 | Status: SHIPPED | OUTPATIENT
Start: 2018-03-01 | End: 2018-03-02 | Stop reason: SDUPTHER

## 2018-03-01 NOTE — PROGRESS NOTES
"HPI     Post-op Evaluation    Additional comments: 1 d po phco iol OD d 2/28//  drops instilled as   directed//           Comments   1 d po phco iol OD d 2/28//  drops instilled as directed//  With glaucoma   drops//       Last edited by Mayuri Steele on 3/1/2018  8:09 AM. (History)            Assessment /Plan     For exam results, see Encounter Report.    Post-operative state    Pseudophakia, both eyes    Anatomical narrow angle glaucoma, bilateral    Chronic anterior uveitis, left    Epiretinal membrane, left    Type 1 diabetes mellitus without retinopathy    Lattice degeneration of retina, bilateral    Choroidal nevus, right    PVD (posterior vitreous detachment), unspecified laterality    Refractive error        Nuclear sclerosis, right - POD #1 s/p Phaco/PCIOL OD on 2/28/18. Doing well. Continue drops QID. Precautions reviewed. RTC 1 week. Using Durezol, given chronic anterior uveitis OS, she also has Ilevro for OS. Previously discussed punctal occlusion technique for steroid gtts.     Pseudophakia OU - mild PCO on NDFE. Observe for now, as also has ERM.    Anatomical narrow angle glaucoma, bilateral - IOP mid teens today OU on Bimatoprost, Combigan, and Dorzolamide - Rx's written for generics, but only has Brimonidine due to Cosopt being on backorder again (keeping generic bimatoprost, switching Combigan to Cosopt BID OU, switch Dorzolamide to Brimonidine TID OS) - will have her continue original regimen as she just picked up refills and cosopt stil on backorder. Last HRT appears stable/WNL OD, OS with increased thinning. Last HVF - scattered defects, "WNL". /570. Continue same gtts for now.    ERM OS - may be new. OCT done today. Re-check in 4-6 months, after CE OS. Pt also given Amsler grid to monitor at home. At this point, VA still corrects to 20/30- despite metamorphopsia. Observation recommended for now.     Chronic anterior uveitis, left - quiet today. Ilevro too expensive with her " insurance - will switch back to Nevanac TID OS when Ilevro runs out.    Type 1 diabetes mellitus without retinopathy - stable. 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 RD precautions.    Choroidal nevus, right - flat with no SRF, no orange, no significant drusen noted    PVD (posterior vitreous detachment), unspecified laterality - RD precautions    Refractive error -  Difficulty improving vision with new MRx. Target emmetropia.               Records reviewed from Deepak Jimenez M.D. - I cannot read the dates?  Looks like last note from Dr. Jimenez was 4/4/16  Tn 20 OD, 21 OS at 1:40 pm  Tmax 23 OD, 36 OS  Conj - rare follicle  K - mild punctate stain I OD, mod OS; no edema; on old KP central OS, min endopigment OS  AC - clear OD; trace flare no cells OS  P - 3.0-3.5 OD, RRL; 3.5-4.0 OS with sphincterotomy I, otherwise RRL  I - no NV  PCIOL OS  DFE OD:  C:D 0.50 x 0.35, oval  Macula - pigment mottling, rare MA T macula, no edema/exudate  Rare blot heme outside macula, few MA;s  PVD with Vizcarra ring  Nevus I 2.5 x 2.0 DD in size  Point traction at 10:45  Vit traction 10:00 mid periphery  Lattice 4:00-5:00; 5:00-5:30, 5:45-6:00; 6:00-6:30  No RT's  DFE OS:  C:D 0.55 x 0.35-0.4  Macula with shallow RPE detachments S macula, rare MA T macuula  No edema or exudates  No blot H  Lattice 10:45-11:00, 11:45-12:15, 1:30-1:45, 4:00-6:00  Point traction posterior to lattice 1:30    Prior note;  Gonio OD - grade IV open 360 to scleral spur, no PAS, iris processes greatest N, mild narrowing of approach  Gonio OS - grade IV open 360 to scleral spur except for PAS 4:00, 4:30, 5:30, 5:45 with grade II open 5:45-7:00, PAS 7:00-9:00, 9;15, 9:30, 10:00, 10:15, 1:30, 1:40, 2:00, 2:30  Mild narrowing of approach  Tn 18//20 at 1:45 pm    Dr. Cheryl Reddy 9/26/16  Ta  11//9  Conj cyst OS  K - rare PEE's OD; K scars OS, rare endopigment OS, diffuse PEE's  Nevanac decreased to TWICE DAILY, LS TWICE DAILY OS, PF AT's  Meds; Lumigain 0.03% OS, Combigan Q12h OU, Dorzolamide TID OS

## 2018-03-01 NOTE — PATIENT INSTRUCTIONS
Continue drops as instructed (Vigamox, Durezol, Ilevro).    Call MD immediately if signs of infection occur (pain, redness, blurred vision). Do not wait for next appointment.    Call MD immediately if you experience new floaters/shadows in your vision, flashes of light, or a curtain or veil appearing to in your vision.    Cell number [phone].

## 2018-03-02 RX ORDER — DORZOLAMIDE HCL 20 MG/ML
1 SOLUTION/ DROPS OPHTHALMIC 3 TIMES DAILY
Qty: 10 ML | Refills: 5 | Status: SHIPPED | OUTPATIENT
Start: 2018-03-02 | End: 2019-01-01 | Stop reason: SDUPTHER

## 2018-03-06 ENCOUNTER — OFFICE VISIT (OUTPATIENT)
Dept: OPHTHALMOLOGY | Facility: CLINIC | Age: 78
End: 2018-03-06
Payer: COMMERCIAL

## 2018-03-06 DIAGNOSIS — H35.413 LATTICE DEGENERATION OF RETINA, BILATERAL: ICD-10-CM

## 2018-03-06 DIAGNOSIS — D31.31 CHOROIDAL NEVUS, RIGHT: ICD-10-CM

## 2018-03-06 DIAGNOSIS — H20.12 CHRONIC ANTERIOR UVEITIS, LEFT: ICD-10-CM

## 2018-03-06 DIAGNOSIS — H43.819 PVD (POSTERIOR VITREOUS DETACHMENT), UNSPECIFIED LATERALITY: ICD-10-CM

## 2018-03-06 DIAGNOSIS — E10.9 TYPE 1 DIABETES MELLITUS WITHOUT RETINOPATHY: ICD-10-CM

## 2018-03-06 DIAGNOSIS — H35.372 EPIRETINAL MEMBRANE, LEFT: ICD-10-CM

## 2018-03-06 DIAGNOSIS — Z96.1 PSEUDOPHAKIA, BOTH EYES: ICD-10-CM

## 2018-03-06 DIAGNOSIS — H52.7 REFRACTIVE ERROR: ICD-10-CM

## 2018-03-06 DIAGNOSIS — Z98.890 POST-OPERATIVE STATE: Primary | ICD-10-CM

## 2018-03-06 DIAGNOSIS — H40.033 ANATOMICAL NARROW ANGLE GLAUCOMA, BILATERAL: ICD-10-CM

## 2018-03-06 PROCEDURE — 99024 POSTOP FOLLOW-UP VISIT: CPT | Mod: S$GLB,,, | Performed by: OPHTHALMOLOGY

## 2018-03-06 PROCEDURE — 99999 PR PBB SHADOW E&M-EST. PATIENT-LVL III: CPT | Mod: PBBFAC,,, | Performed by: OPHTHALMOLOGY

## 2018-03-06 NOTE — PATIENT INSTRUCTIONS
Continue Prednisolone and Vigamox QID until Friday, then Continue Vigamox QID until gone. Taper Durezol 3x/day for 1 week then 2x/day for 1 week then 1x/day for 1 week then stop. Continue Ilevro 1x/day for 1 month.

## 2018-03-06 NOTE — PROGRESS NOTES
"HPI     Post-op Evaluation    Additional comments: 1 wk s/p sphaco iol OD 2/28/17           Comments   Pt states no pain or irritation and seeing well. No floaters or flashes.     Gtts:  lumigan OS qhs, combigan BID OU, trusopt TID OS, ilevro QD OS and   moxifloxacin QID, ilevro QD, Durezol QID OD       Last edited by Cheryle Quintana on 3/6/2018  3:16 PM. (History)            Assessment /Plan     For exam results, see Encounter Report.    Post-operative state    Pseudophakia, both eyes    Anatomical narrow angle glaucoma, bilateral    Chronic anterior uveitis, left    Epiretinal membrane, left    Type 1 diabetes mellitus without retinopathy    Lattice degeneration of retina, bilateral    Choroidal nevus, right    PVD (posterior vitreous detachment), unspecified laterality    Refractive error            Nuclear sclerosis, right - POD #6 s/p Phaco/PCIOL OD on 2/28/18. Doing well. Continue Prednisolone and Vigamox QID until Friday, then Continue Vigamox QID until gone. Taper Durezol 3x/day for 1 week then 2x/day for 1 week then 1x/day for 1 week then stop. Continue Ilevro 1x/day for 1 month.  Precautions reviewed. RTC 1 week. Using Durezol, given chronic anterior uveitis OS, she also has Ilevro for OS. Previously discussed punctal occlusion technique for steroid gtts.     Pseudophakia OU - mild PCO on NDFE. Observe for now, as also has ERM.    Anatomical narrow angle glaucoma, bilateral - IOP mid teens today OU on Bimatoprost, Combigan, and Dorzolamide - Rx's written for generics, but only has Brimonidine due to Cosopt being on backorder again (keeping generic bimatoprost, switching Combigan to Cosopt BID OU, switch Dorzolamide to Brimonidine TID OS) - will have her continue original regimen as she just picked up refills and cosopt stil on backorder. Last HRT appears stable/WNL OD, OS with increased thinning. Last HVF - scattered defects, "WNL". /570. Continue same gtts for now.    ERM OS - may be new. OCT done " today. Re-check in 4-6 months, after CE OS. Pt also given Amsler grid to monitor at home. At this point, VA still corrects to 20/30- despite metamorphopsia. Observation recommended for now.     Chronic anterior uveitis, left - quiet today. Ilevro too expensive with her insurance - will switch back to Nevanac TID OS when Ilevro runs out.    Type 1 diabetes mellitus without retinopathy - stable. 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 RD precautions.    Choroidal nevus, right - flat with no SRF, no orange, no significant drusen noted    PVD (posterior vitreous detachment), unspecified laterality - RD precautions    Refractive error -  Difficulty improving vision with new MRx. Target emmetropia.               Records reviewed from Deepak Jimenez M.D. - I cannot read the dates?  Looks like last note from Dr. Jimneez was 4/4/16  Tn 20 OD, 21 OS at 1:40 pm  Tmax 23 OD, 36 OS  Conj - rare follicle  K - mild punctate stain I OD, mod OS; no edema; on old KP central OS, min endopigment OS  AC - clear OD; trace flare no cells OS  P - 3.0-3.5 OD, RRL; 3.5-4.0 OS with sphincterotomy I, otherwise RRL  I - no NV  PCIOL OS  DFE OD:  C:D 0.50 x 0.35, oval  Macula - pigment mottling, rare MA T macula, no edema/exudate  Rare blot heme outside macula, few MA;s  PVD with Vizcarra ring  Nevus I 2.5 x 2.0 DD in size  Point traction at 10:45  Vit traction 10:00 mid periphery  Lattice 4:00-5:00; 5:00-5:30, 5:45-6:00; 6:00-6:30  No RT's  DFE OS:  C:D 0.55 x 0.35-0.4  Macula with shallow RPE detachments S macula, rare MA T macuula  No edema or exudates  No blot H  Lattice 10:45-11:00, 11:45-12:15, 1:30-1:45, 4:00-6:00  Point traction posterior to lattice 1:30    Prior note;  Gonio OD - grade IV open 360 to scleral spur, no PAS, iris processes greatest N, mild narrowing  of approach  Gonio OS - grade IV open 360 to scleral spur except for PAS 4:00, 4:30, 5:30, 5:45 with grade II open 5:45-7:00, PAS 7:00-9:00, 9;15, 9:30, 10:00, 10:15, 1:30, 1:40, 2:00, 2:30  Mild narrowing of approach  Tn 18//20 at 1:45 pm    Dr. Cheryl Reddy 9/26/16  Ta 11//9  Conj cyst OS  K - rare PEE's OD; K scars OS, rare endopigment OS, diffuse PEE's  Nevanac decreased to TWICE DAILY, LS TWICE DAILY OS, PF AT's  Meds; Lumigain 0.03% OS, Combigan Q12h OU, Dorzolamide TID OS

## 2018-04-03 ENCOUNTER — OFFICE VISIT (OUTPATIENT)
Dept: OPHTHALMOLOGY | Facility: CLINIC | Age: 78
End: 2018-04-03
Payer: COMMERCIAL

## 2018-04-03 DIAGNOSIS — D31.31 CHOROIDAL NEVUS, RIGHT: ICD-10-CM

## 2018-04-03 DIAGNOSIS — H40.033 ANATOMICAL NARROW ANGLE GLAUCOMA, BILATERAL: ICD-10-CM

## 2018-04-03 DIAGNOSIS — H35.372 EPIRETINAL MEMBRANE, LEFT: ICD-10-CM

## 2018-04-03 DIAGNOSIS — Z98.890 POST-OPERATIVE STATE: Primary | ICD-10-CM

## 2018-04-03 DIAGNOSIS — H43.819 PVD (POSTERIOR VITREOUS DETACHMENT), UNSPECIFIED LATERALITY: ICD-10-CM

## 2018-04-03 DIAGNOSIS — H35.413 LATTICE DEGENERATION OF RETINA, BILATERAL: ICD-10-CM

## 2018-04-03 DIAGNOSIS — Z96.1 PSEUDOPHAKIA, BOTH EYES: ICD-10-CM

## 2018-04-03 DIAGNOSIS — H52.7 REFRACTIVE ERROR: ICD-10-CM

## 2018-04-03 DIAGNOSIS — E10.9 TYPE 1 DIABETES MELLITUS WITHOUT RETINOPATHY: ICD-10-CM

## 2018-04-03 DIAGNOSIS — H20.12 CHRONIC ANTERIOR UVEITIS, LEFT: ICD-10-CM

## 2018-04-03 PROCEDURE — 99999 PR PBB SHADOW E&M-EST. PATIENT-LVL III: CPT | Mod: PBBFAC,,, | Performed by: OPHTHALMOLOGY

## 2018-04-03 PROCEDURE — 99024 POSTOP FOLLOW-UP VISIT: CPT | Mod: S$GLB,,, | Performed by: OPHTHALMOLOGY

## 2018-04-03 RX ORDER — LISINOPRIL 5 MG/1
TABLET ORAL
Refills: 3 | COMMUNITY
Start: 2018-03-12 | End: 2019-01-01

## 2018-04-03 NOTE — PROGRESS NOTES
"HPI     Post-op Evaluation    Additional comments: sp phaco iol 2/28/2018//  finished drops as   directed//           Comments   sp phaco iol 2/28/2018//  finished drops as directed//  Glaucoma  Lumigan OS QHS, combigan BID OU, DorzolamideTID OS, Ilevro QD OS    Sees a zig zag design when she looks to the side, color, lasts 15-20   minutes. Denies headache or any other neurologic symptoms. Denies history   of migraine. Denies any changes in waviness on Amsler grid at home.        Last edited by Malena Acevedo MD on 4/3/2018 11:00 AM.   (History)            Assessment /Plan     For exam results, see Encounter Report.    Post-operative state    Anatomical narrow angle glaucoma, bilateral    Pseudophakia, both eyes    Chronic anterior uveitis, left    Epiretinal membrane, left    Type 1 diabetes mellitus without retinopathy    Lattice degeneration of retina, bilateral    Choroidal nevus, right    PVD (posterior vitreous detachment), unspecified laterality    Refractive error            Nuclear sclerosis, right - 1 month s/p Phaco/PCIOL OD on 2/28/18. Doing well.     Pseudophakia OU - mild PCO on NDFE. Observe for now, as also has ERM.    Anatomical narrow angle glaucoma, bilateral - IOP stable today OU on Bimatoprost, Combigan, and Dorzolamide - Rx's written for generics, but only has Brimonidine due to Cosopt being on backorder again (keeping generic bimatoprost, switching Combigan to Cosopt BID OU, switch Dorzolamide to Brimonidine TID OS) - will have her continue original regimen as she just picked up refills and cosopt stil on backorder. Last HRT appears stable/WNL OD, OS with increased thinning. Last HVF - scattered defects, "WNL". /570. Continue same gtts for now.    ERM OS - may be new. OCT done today 1/11/18. Re-check in 4-6 months, after CE OD. Pt also given Amsler grid to monitor at home. At this point, VA still corrects to 20/30- despite metamorphopsia. Observation recommended for now. "     Chronic anterior uveitis, left - quiet today. Ilevro too expensive with her insurance - will switch back to Nevanac TID OS when Ilevro runs out.    Type 1 diabetes mellitus without retinopathy - stable. 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 RD precautions.    Choroidal nevus, right - flat with no SRF, no orange, no significant drusen noted    PVD (posterior vitreous detachment), unspecified laterality - RD precautions    Refractive error -  Target emmetropia. MRx given today.              Records reviewed from Deepak Jimenez M.D. - I cannot read the dates?  Looks like last note from Dr. Jimenez was 4/4/16  Tn 20 OD, 21 OS at 1:40 pm  Tmax 23 OD, 36 OS  Conj - rare follicle  K - mild punctate stain I OD, mod OS; no edema; on old KP central OS, min endopigment OS  AC - clear OD; trace flare no cells OS  P - 3.0-3.5 OD, RRL; 3.5-4.0 OS with sphincterotomy I, otherwise RRL  I - no NV  PCIOL OS  DFE OD:  C:D 0.50 x 0.35, oval  Macula - pigment mottling, rare MA T macula, no edema/exudate  Rare blot heme outside macula, few MA;s  PVD with Vizcarra ring  Nevus I 2.5 x 2.0 DD in size  Point traction at 10:45  Vit traction 10:00 mid periphery  Lattice 4:00-5:00; 5:00-5:30, 5:45-6:00; 6:00-6:30  No RT's  DFE OS:  C:D 0.55 x 0.35-0.4  Macula with shallow RPE detachments S macula, rare MA T macuula  No edema or exudates  No blot H  Lattice 10:45-11:00, 11:45-12:15, 1:30-1:45, 4:00-6:00  Point traction posterior to lattice 1:30    Prior note;  Gonio OD - grade IV open 360 to scleral spur, no PAS, iris processes greatest N, mild narrowing of approach  Gonio OS - grade IV open 360 to scleral spur except for PAS 4:00, 4:30, 5:30, 5:45 with grade II open 5:45-7:00, PAS 7:00-9:00, 9;15, 9:30, 10:00, 10:15, 1:30, 1:40, 2:00, 2:30  Mild narrowing of approach  Tn  18//20 at 1:45 pm    Dr. Cheryl Reddy 9/26/16  Ta 11//9  Conj cyst OS  K - rare PEE's OD; K scars OS, rare endopigment OS, diffuse PEE's  Nevanac decreased to TWICE DAILY, LS TWICE DAILY OS, PF AT's  Meds; Lumigain 0.03% OS, Combigan Q12h OU, Dorzolamide TID OS

## 2018-04-12 RX ORDER — BRIMONIDINE TARTRATE, TIMOLOL MALEATE 2; 5 MG/ML; MG/ML
SOLUTION/ DROPS OPHTHALMIC
Qty: 10 ML | Refills: 11 | Status: SHIPPED | OUTPATIENT
Start: 2018-04-12 | End: 2019-01-01 | Stop reason: SDUPTHER

## 2018-04-12 RX ORDER — BIMATOPROST 0.3 MG/ML
1 SOLUTION/ DROPS OPHTHALMIC NIGHTLY
Qty: 3 ML | Refills: 11 | Status: SHIPPED | OUTPATIENT
Start: 2018-04-12 | End: 2018-07-10 | Stop reason: SDUPTHER

## 2018-04-12 RX ORDER — BRIMONIDINE TARTRATE, TIMOLOL MALEATE 2; 5 MG/ML; MG/ML
SOLUTION/ DROPS OPHTHALMIC
Qty: 10 ML | Refills: 11 | Status: SHIPPED | OUTPATIENT
Start: 2018-04-12 | End: 2018-04-12 | Stop reason: SDUPTHER

## 2018-04-12 RX ORDER — BIMATOPROST 0.3 MG/ML
1 SOLUTION/ DROPS OPHTHALMIC NIGHTLY
Qty: 3 ML | Refills: 11 | Status: SHIPPED | OUTPATIENT
Start: 2018-04-12 | End: 2018-04-12 | Stop reason: SDUPTHER

## 2018-04-13 ENCOUNTER — TELEPHONE (OUTPATIENT)
Dept: OPHTHALMOLOGY | Facility: CLINIC | Age: 78
End: 2018-04-13

## 2018-04-13 NOTE — TELEPHONE ENCOUNTER
Called pt to inform that Dr Acevedo sent rx refills for bimatoprost and combigan to the [facility] on [address].

## 2018-04-13 NOTE — TELEPHONE ENCOUNTER
----- Message from Mayuri Steele sent at 4/12/2018  5:01 PM CDT -----  Call pt ler them know all drops have been called in//

## 2018-05-07 ENCOUNTER — CLINICAL SUPPORT (OUTPATIENT)
Dept: CARDIOLOGY | Facility: CLINIC | Age: 78
End: 2018-05-07
Attending: INTERNAL MEDICINE
Payer: COMMERCIAL

## 2018-05-07 DIAGNOSIS — Z95.0 CARDIAC PACEMAKER IN SITU: ICD-10-CM

## 2018-05-07 DIAGNOSIS — I49.5 SICK SINUS SYNDROME: ICD-10-CM

## 2018-05-07 PROCEDURE — 93294 REM INTERROG EVL PM/LDLS PM: CPT | Mod: S$GLB,,, | Performed by: INTERNAL MEDICINE

## 2018-05-07 PROCEDURE — 93296 REM INTERROG EVL PM/IDS: CPT | Mod: S$GLB,,, | Performed by: INTERNAL MEDICINE

## 2018-05-29 ENCOUNTER — TELEPHONE (OUTPATIENT)
Dept: OPHTHALMOLOGY | Facility: CLINIC | Age: 78
End: 2018-05-29

## 2018-05-29 NOTE — TELEPHONE ENCOUNTER
Left message to inform pt that we needed to reschedule her 7/10 appt with Dr Acevedo. Dr Acevedo will be moving to [place] full time and we will be adding a new full time doctor, Dr Holbrook, in [place]. Asked pt to return call to reschedule appt.

## 2018-06-07 ENCOUNTER — OFFICE VISIT (OUTPATIENT)
Dept: CARDIOLOGY | Facility: CLINIC | Age: 78
End: 2018-06-07
Payer: COMMERCIAL

## 2018-06-07 VITALS
HEIGHT: 66 IN | HEART RATE: 63 BPM | WEIGHT: 142.44 LBS | SYSTOLIC BLOOD PRESSURE: 143 MMHG | BODY MASS INDEX: 22.89 KG/M2 | DIASTOLIC BLOOD PRESSURE: 64 MMHG

## 2018-06-07 DIAGNOSIS — R55 SYNCOPE, UNSPECIFIED SYNCOPE TYPE: ICD-10-CM

## 2018-06-07 DIAGNOSIS — I49.5 SICK SINUS SYNDROME: ICD-10-CM

## 2018-06-07 DIAGNOSIS — Z86.79 HISTORY OF ATRIAL FIBRILLATION: Primary | ICD-10-CM

## 2018-06-07 DIAGNOSIS — I10 ESSENTIAL HYPERTENSION: ICD-10-CM

## 2018-06-07 PROCEDURE — 3077F SYST BP >= 140 MM HG: CPT | Mod: CPTII,S$GLB,, | Performed by: INTERNAL MEDICINE

## 2018-06-07 PROCEDURE — 3078F DIAST BP <80 MM HG: CPT | Mod: CPTII,S$GLB,, | Performed by: INTERNAL MEDICINE

## 2018-06-07 PROCEDURE — 99214 OFFICE O/P EST MOD 30 MIN: CPT | Mod: S$GLB,,, | Performed by: INTERNAL MEDICINE

## 2018-06-07 PROCEDURE — 99999 PR PBB SHADOW E&M-EST. PATIENT-LVL II: CPT | Mod: PBBFAC,,, | Performed by: INTERNAL MEDICINE

## 2018-06-07 NOTE — PROGRESS NOTES
Subjective:    Patient ID:  April Lea is a 78 y.o. female who presents for follow-up of hypertension    HPI  She comes with no complaints, no chest pain, no shortness of breath  BP rather high latetly    Review of Systems   Constitution: Negative for decreased appetite, weakness, malaise/fatigue, weight gain and weight loss.   Cardiovascular: Negative for chest pain, dyspnea on exertion, leg swelling, palpitations and syncope.   Respiratory: Negative for cough and shortness of breath.    Gastrointestinal: Negative.    All other systems reviewed and are negative.       Objective:    Physical Exam   Constitutional: She is oriented to person, place, and time. She appears well-developed and well-nourished.   HENT:   Head: Normocephalic.   Eyes: Pupils are equal, round, and reactive to light.   Neck: Normal range of motion. Neck supple. No JVD present. Carotid bruit is not present. No thyromegaly present.   Cardiovascular: Normal rate, regular rhythm, normal heart sounds, intact distal pulses and normal pulses.  PMI is not displaced.  Exam reveals no gallop.    No murmur heard.  Pulmonary/Chest: Effort normal and breath sounds normal.   Abdominal: Soft. Normal appearance. She exhibits no mass. There is no hepatosplenomegaly. There is no tenderness.   Musculoskeletal: Normal range of motion. She exhibits no edema.   Neurological: She is alert and oriented to person, place, and time. She has normal strength and normal reflexes. No sensory deficit.   Skin: Skin is warm and intact.   Psychiatric: She has a normal mood and affect.   Nursing note and vitals reviewed.        Assessment:       1. History of atrial fibrillation    2. Essential hypertension    3. Sick sinus syndrome    4. Syncope, unspecified syncope type         Plan:   Increase lisinopril to 5 am, 2.5 pm  Continue all cardiac medications  Regular exercise program  6 m f/u

## 2018-07-10 ENCOUNTER — OFFICE VISIT (OUTPATIENT)
Dept: OPHTHALMOLOGY | Facility: CLINIC | Age: 78
End: 2018-07-10
Attending: OPHTHALMOLOGY
Payer: COMMERCIAL

## 2018-07-10 DIAGNOSIS — H35.372 EPIRETINAL MEMBRANE, LEFT: Primary | ICD-10-CM

## 2018-07-10 DIAGNOSIS — H40.033 ANATOMICAL NARROW ANGLE GLAUCOMA, BILATERAL: ICD-10-CM

## 2018-07-10 DIAGNOSIS — E10.9 TYPE 1 DIABETES MELLITUS WITHOUT RETINOPATHY: ICD-10-CM

## 2018-07-10 PROCEDURE — 99999 PR PBB SHADOW E&M-EST. PATIENT-LVL III: CPT | Mod: PBBFAC,,, | Performed by: OPHTHALMOLOGY

## 2018-07-10 PROCEDURE — 92012 INTRM OPH EXAM EST PATIENT: CPT | Mod: S$GLB,,, | Performed by: OPHTHALMOLOGY

## 2018-07-10 RX ORDER — BIMATOPROST 0.3 MG/ML
1 SOLUTION/ DROPS OPHTHALMIC NIGHTLY
Qty: 3 ML | Refills: 11 | Status: SHIPPED | OUTPATIENT
Start: 2018-07-10 | End: 2019-01-01 | Stop reason: SDUPTHER

## 2018-07-10 RX ORDER — LEVOTHYROXINE SODIUM 88 UG/1
TABLET ORAL
Refills: 3 | COMMUNITY
Start: 2018-06-05 | End: 2018-12-07 | Stop reason: SDUPTHER

## 2018-07-10 RX ORDER — BIMATOPROST 0.3 MG/ML
1 SOLUTION/ DROPS OPHTHALMIC NIGHTLY
Qty: 3 ML | Refills: 11 | Status: SHIPPED | OUTPATIENT
Start: 2018-07-10 | End: 2018-07-10 | Stop reason: SDUPTHER

## 2018-07-10 NOTE — PROGRESS NOTES
HPI     Diabetes    Additional comments: controlled            Post-op Evaluation    Additional comments: sp phaco IOL 2-           Comments   DLS- 4-3-2018 By Kristina    Pt is here for 3 month IOP check and OCT mac. No changes with vision.     Hemoglobin A1C       Date                     Value               Ref Range             Status                07/22/2017               7.4 (H)             0.0 - 5.6 %           Final              Comment:    Reference Interval:  5.0 - 5.6 Normal   5.7 - 6.4 High Risk   > 6.5   Diabetic    Hgb A1c results are standardized based on the (NGSP) National     Glycohemoglobin Standardization Program.    Hemoglobin A1C levels are   related to mean serum/plasma glucose   during the preceding 2-3 months.              02/28/2017               7.7 (H)             0.0 - 5.6 %           Final              Comment:    Reference Interval:  5.0 - 5.6 Normal   5.7 - 6.4 High Risk   > 6.5   Diabetic    Hgb A1c results are standardized based on the (NGSP) National     Glycohemoglobin Standardization Program.    Hemoglobin A1C levels are   related to mean serum/plasma glucose   during the preceding 2-3 months.              10/05/2016               7.7 (H)             0.0 - 5.6 %           Final              Comment:    Reference Interval:  5.0 - 5.6 Normal   5.7 - 6.4 High Risk   > 6.5   Diabetic    Hgb A1c results are standardized based on the (NGSP) National     Glycohemoglobin Standardization Program.    Hemoglobin A1C levels are   related to mean serum/plasma glucose   during the preceding 2-3 months.         ----------         Last edited by Sedrick Brunson on 7/10/2018  2:11 PM. (History)            Assessment /Plan     For exam results, see Encounter Report.    Epiretinal membrane, left  -Posterior Segment OCT Retina-Both eyes showed small PED's OU w/ ERM OS  -No change in vision  -Continue Amsler monitoring  -Counseled patient that if symptoms worsen; may need retina surgery to  definitively correct the problem    Anatomical narrow angle glaucoma, bilateral  - Good IOP today Bimatoprost and Dorazolamide OS and Combigan OD; continue current drop regimen     Chronic anterior uveitis, left   - No A/C rxn noted today, finish Ilvero; hold nevanac  -Counseled patient on symptoms of ocular inflammation, and advised to start nevanac 3x daily and RTC      Pseudophakia OS   - mild PCO on NDFE. Observe for now, as also has ERM.     Type 1 diabetes mellitus without retinopathy  - stable. 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 RD precautions.     Choroidal nevus, right  - flat with no SRF, no orange, no significant drusen noted     PVD (posterior vitreous detachment), unspecified laterality  - RD precautions     Refractive error

## 2018-07-10 NOTE — LETTER
July 10, 2018      Malena Acevedo MD  [address] [place] [address] [address]  [address]  [facility] [address]           [facility]  [address] [facility] [address] [place] [address]  Phone: [phone]  Fax: [phone]          Patient: April Lea   MR Number: [id number]   YOB: 1940   Date of Visit: 7/10/2018       Dear Dr. Malena Acevedo:    Thank you for referring April Lea to me for evaluation. Attached you will find relevant portions of my assessment and plan of care.    If you have questions, please do not hesitate to call me. I look forward to following April Lea along with you.    Sincerely,    Richard Holbrook Jr., MD    Enclosure  CC:  No Recipients    If you would like to receive this communication electronically, please contact [email or web address] or [phone] to request more information on [facility] access.    For providers and/or their staff who would like to refer a patient to [facility], please contact us through our one-stop-shop provider referral line, [facility], at [phone].    If you feel you have received this communication in error or would no longer like to receive these types of communications, please e-mail [email or web address]

## 2018-08-13 ENCOUNTER — CLINICAL SUPPORT (OUTPATIENT)
Dept: CARDIOLOGY | Facility: CLINIC | Age: 78
End: 2018-08-13
Attending: INTERNAL MEDICINE
Payer: COMMERCIAL

## 2018-08-13 DIAGNOSIS — I49.5 SICK SINUS SYNDROME: ICD-10-CM

## 2018-08-13 DIAGNOSIS — Z95.0 CARDIAC PACEMAKER IN SITU: ICD-10-CM

## 2018-08-13 DIAGNOSIS — Z95.0 CARDIAC PACEMAKER IN SITU: Primary | ICD-10-CM

## 2018-08-13 PROCEDURE — 93296 REM INTERROG EVL PM/IDS: CPT | Mod: ,,, | Performed by: INTERNAL MEDICINE

## 2018-08-13 PROCEDURE — 93294 REM INTERROG EVL PM/LDLS PM: CPT | Mod: ,,, | Performed by: INTERNAL MEDICINE

## 2018-08-20 LAB
AV DELAY - LONGEST: 390 MSEC
AV DELAY - SHORTEST: 230 MSEC
IMPEDANCE RA LEAD (NATIVE): 527 OHMS
IMPEDANCE RA LEAD: 488 OHMS
OHS CV DC PP MS1: 0.4 MS
OHS CV DC PP MS2: 0.4 MS
OHS CV DC PP V1: 2.4 V
OHS CV DC PP V2: NORMAL V
P/R-WAVE RA LEAD (NATIVE): 5.7 MV
P/R-WAVE RA LEAD: 1.4 MV
PV DELAY - LONGEST: 390 MSEC
PV DELAY - SHORTEST: 230 MSEC
THRESHOLD MS RA LEAD: 0.4 MS
THRESHOLD V RA LEAD: 1.5 V

## 2018-08-27 NOTE — TELEPHONE ENCOUNTER
----- Message from Gwen Rivera sent at 8/27/2018 10:07 AM CDT -----  Contact: daughter  Daughter - Rita Rendon - [phone] is calling/requesting to please send a new prescription for Xarelton 20mg - take one tablet in the evening/please advise daughter when sent      [facility] [address] [place] [address] AT [facility] [address] [address]  Phone: [phone] Fax: [phone]

## 2018-11-14 ENCOUNTER — OFFICE VISIT (OUTPATIENT)
Dept: OPHTHALMOLOGY | Facility: CLINIC | Age: 78
End: 2018-11-14
Payer: COMMERCIAL

## 2018-11-14 DIAGNOSIS — E10.9 TYPE 1 DIABETES MELLITUS WITHOUT RETINOPATHY: ICD-10-CM

## 2018-11-14 DIAGNOSIS — H35.413 LATTICE DEGENERATION OF RETINA, BILATERAL: ICD-10-CM

## 2018-11-14 DIAGNOSIS — H35.372 EPIRETINAL MEMBRANE, LEFT: ICD-10-CM

## 2018-11-14 DIAGNOSIS — H40.033 ANATOMICAL NARROW ANGLE GLAUCOMA, BILATERAL: Primary | ICD-10-CM

## 2018-11-14 DIAGNOSIS — H20.12 CHRONIC ANTERIOR UVEITIS, LEFT: ICD-10-CM

## 2018-11-14 DIAGNOSIS — Z96.1 PSEUDOPHAKIA, BOTH EYES: ICD-10-CM

## 2018-11-14 DIAGNOSIS — H35.723 RETINAL PIGMENT EPITHELIAL DETACHMENT OF BOTH EYES: ICD-10-CM

## 2018-11-14 DIAGNOSIS — H43.819 PVD (POSTERIOR VITREOUS DETACHMENT), UNSPECIFIED LATERALITY: ICD-10-CM

## 2018-11-14 DIAGNOSIS — D31.31 CHOROIDAL NEVUS, RIGHT: ICD-10-CM

## 2018-11-14 PROCEDURE — 99999 PR PBB SHADOW E&M-EST. PATIENT-LVL II: CPT | Mod: PBBFAC,,, | Performed by: OPHTHALMOLOGY

## 2018-11-14 PROCEDURE — 92012 INTRM OPH EXAM EST PATIENT: CPT | Mod: S$GLB,,, | Performed by: OPHTHALMOLOGY

## 2018-11-14 RX ORDER — INSULIN GLARGINE 100 [IU]/ML
INJECTION, SOLUTION SUBCUTANEOUS
Refills: 1 | COMMUNITY
Start: 2018-10-09 | End: 2019-01-01

## 2018-11-14 NOTE — PROGRESS NOTES
HPI     DLS- 7-     4 month IOP ck, pt states no va changes. Pt is taking drops as directed   Lumigan x1 at bed time OS, Combigan x2 OU, Dorzolamide x3 OS. Denies eye   pain.      Last edited by Sedrick Brunson on 11/14/2018  2:37 PM. (History)        ROS     Negative for: Constitutional, Gastrointestinal, Neurological, Skin,   Genitourinary, Musculoskeletal, HENT, Endocrine, Cardiovascular, Eyes,   Respiratory, Psychiatric, Allergic/Imm, Heme/Lymph    Last edited by Richard Holbrook Jr., MD on 11/14/2018  3:12 PM. (History)        Assessment /Plan     For exam results, see Encounter Report.    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    Lattice degeneration of retina, bilateral    Choroidal nevus, right    PVD (posterior vitreous detachment), unspecified laterality        Anatomical narrow angle glaucoma, bilateral  - Good IOP today Bimatoprost OS QHS and Dorazolamide OS TID and Combigan OU; continue current drop regimen      Epiretinal membrane, left  -Posterior Segment OCT Retina-Both eyes showed small PED's OU w/ ERM OS  -No change in vision  -Continue Amsler monitoring  -Counseled patient that if symptoms worsen; may need retina surgery to definitively correct the problem    Chronic anterior uveitis, left   - No A/C rxn noted today, finish Ilvero; hold nevanac  -Counseled patient on symptoms of ocular inflammation, and advised to start nevanac 3x daily and RTC      Pseudophakia OS   - mild PCO on NDFE. Observe for now, as also has ERM.     Type 1 diabetes mellitus without retinopathy  - stable. 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 RD precautions.     Choroidal nevus,  right  - flat with no SRF, no orange, no significant drusen noted     PVD (posterior vitreous detachment), unspecified laterality  - RD precautions     Refractive error- continue current glass rx

## 2018-12-07 ENCOUNTER — OFFICE VISIT (OUTPATIENT)
Dept: CARDIOLOGY | Facility: CLINIC | Age: 78
End: 2018-12-07
Payer: COMMERCIAL

## 2018-12-07 ENCOUNTER — CLINICAL SUPPORT (OUTPATIENT)
Dept: CARDIOLOGY | Facility: CLINIC | Age: 78
End: 2018-12-07
Attending: INTERNAL MEDICINE
Payer: COMMERCIAL

## 2018-12-07 VITALS
WEIGHT: 145.06 LBS | DIASTOLIC BLOOD PRESSURE: 58 MMHG | HEIGHT: 66 IN | BODY MASS INDEX: 23.31 KG/M2 | SYSTOLIC BLOOD PRESSURE: 116 MMHG | HEART RATE: 60 BPM

## 2018-12-07 DIAGNOSIS — I10 ESSENTIAL HYPERTENSION: Primary | ICD-10-CM

## 2018-12-07 DIAGNOSIS — I49.5 SICK SINUS SYNDROME: ICD-10-CM

## 2018-12-07 DIAGNOSIS — I48.0 PAF (PAROXYSMAL ATRIAL FIBRILLATION): ICD-10-CM

## 2018-12-07 DIAGNOSIS — Z95.0 CARDIAC PACEMAKER IN SITU: ICD-10-CM

## 2018-12-07 LAB
AV DELAY - LONGEST: 390 MSEC
AV DELAY - SHORTEST: 230 MSEC
IMPEDANCE RA LEAD (NATIVE): 526 OHMS
IMPEDANCE RA LEAD: 565 OHMS
P/R-WAVE RA LEAD (NATIVE): 8.1 MV
P/R-WAVE RA LEAD: 1.6 MV
PV DELAY - LONGEST: 390 MSEC
PV DELAY - SHORTEST: 230 MSEC
THRESHOLD MS RA LEAD (DONOR): 0.4 MS
THRESHOLD MS RA LEAD (NATIVE): 0.4 MS
THRESHOLD MS RA LEAD: 0.4 MS
THRESHOLD V RA LEAD (DONOR): 1.5 V
THRESHOLD V RA LEAD (NATIVE): 1.4 V
THRESHOLD V RA LEAD: 1 V

## 2018-12-07 PROCEDURE — 93280 PM DEVICE PROGR EVAL DUAL: CPT | Mod: S$GLB,,, | Performed by: INTERNAL MEDICINE

## 2018-12-07 PROCEDURE — 3078F DIAST BP <80 MM HG: CPT | Mod: CPTII,S$GLB,, | Performed by: INTERNAL MEDICINE

## 2018-12-07 PROCEDURE — 99214 OFFICE O/P EST MOD 30 MIN: CPT | Mod: S$GLB,,, | Performed by: INTERNAL MEDICINE

## 2018-12-07 PROCEDURE — 3074F SYST BP LT 130 MM HG: CPT | Mod: CPTII,S$GLB,, | Performed by: INTERNAL MEDICINE

## 2018-12-07 PROCEDURE — 1101F PT FALLS ASSESS-DOCD LE1/YR: CPT | Mod: CPTII,S$GLB,, | Performed by: INTERNAL MEDICINE

## 2018-12-07 PROCEDURE — 99999 PR PBB SHADOW E&M-EST. PATIENT-LVL III: CPT | Mod: PBBFAC,,, | Performed by: INTERNAL MEDICINE

## 2018-12-07 NOTE — PROGRESS NOTES
Subjective:    Patient ID:  April Lea is a 78 y.o. female who presents for follow-up of SSS    HPI  She comes with no complaints, no chest pain, no shortness of breath  FC II    Review of Systems   Constitution: Negative for decreased appetite, weakness, malaise/fatigue, weight gain and weight loss.   Cardiovascular: Negative for chest pain, dyspnea on exertion, leg swelling, palpitations and syncope.   Respiratory: Negative for cough and shortness of breath.    Gastrointestinal: Negative.    All other systems reviewed and are negative.       Objective:      Physical Exam   Constitutional: She is oriented to person, place, and time. She appears well-developed and well-nourished.   HENT:   Head: Normocephalic.   Eyes: Pupils are equal, round, and reactive to light.   Neck: Normal range of motion. Neck supple. No JVD present. Carotid bruit is not present. No thyromegaly present.   Cardiovascular: Normal rate, regular rhythm, normal heart sounds, intact distal pulses and normal pulses. PMI is not displaced. Exam reveals no gallop.   No murmur heard.  Pulmonary/Chest: Effort normal and breath sounds normal.   Abdominal: Soft. Normal appearance. She exhibits no mass. There is no hepatosplenomegaly. There is no tenderness.   Musculoskeletal: Normal range of motion. She exhibits no edema.   Neurological: She is alert and oriented to person, place, and time. She has normal strength and normal reflexes. No sensory deficit.   Skin: Skin is warm and intact.   Psychiatric: She has a normal mood and affect.   Nursing note and vitals reviewed.        Assessment:       1. Essential hypertension    2. PAF (paroxysmal atrial fibrillation)    3. Sick sinus syndrome         Plan:     Continue all cardiac medications  Regular exercise program  Weight loss  9 m f/u

## 2018-12-17 DIAGNOSIS — R55 SYNCOPE, UNSPECIFIED SYNCOPE TYPE: ICD-10-CM

## 2018-12-17 DIAGNOSIS — Z95.0 CARDIAC PACEMAKER IN SITU: Primary | ICD-10-CM

## 2019-01-01 ENCOUNTER — HOSPITAL ENCOUNTER (OUTPATIENT)
Dept: RADIOLOGY | Facility: HOSPITAL | Age: 79
Discharge: HOME | End: 2019-10-21
Attending: ORTHOPAEDIC SURGERY
Payer: COMMERCIAL

## 2019-01-01 ENCOUNTER — CLINICAL SUPPORT (OUTPATIENT)
Dept: CARDIOLOGY | Facility: CLINIC | Age: 79
End: 2019-01-01
Payer: COMMERCIAL

## 2019-01-01 ENCOUNTER — TELEPHONE (OUTPATIENT)
Dept: ORTHOPEDICS | Facility: CLINIC | Age: 79
End: 2019-01-01

## 2019-01-01 ENCOUNTER — CLINICAL SUPPORT (OUTPATIENT)
Dept: CARDIOLOGY | Facility: CLINIC | Age: 79
End: 2019-01-01
Attending: INTERNAL MEDICINE
Payer: COMMERCIAL

## 2019-01-01 ENCOUNTER — OFFICE VISIT (OUTPATIENT)
Dept: ORTHOPEDICS | Facility: CLINIC | Age: 79
End: 2019-01-01
Payer: COMMERCIAL

## 2019-01-01 ENCOUNTER — CLINICAL SUPPORT (OUTPATIENT)
Dept: OPHTHALMOLOGY | Facility: CLINIC | Age: 79
End: 2019-01-01
Attending: OPHTHALMOLOGY
Payer: COMMERCIAL

## 2019-01-01 ENCOUNTER — HOSPITAL ENCOUNTER (OUTPATIENT)
Dept: RADIOLOGY | Facility: HOSPITAL | Age: 79
Discharge: HOME | End: 2019-10-28
Attending: ORTHOPAEDIC SURGERY
Payer: COMMERCIAL

## 2019-01-01 ENCOUNTER — OFFICE VISIT (OUTPATIENT)
Dept: CARDIOLOGY | Facility: CLINIC | Age: 79
End: 2019-01-01
Payer: COMMERCIAL

## 2019-01-01 ENCOUNTER — OFFICE VISIT (OUTPATIENT)
Dept: OPHTHALMOLOGY | Facility: CLINIC | Age: 79
End: 2019-01-01
Payer: COMMERCIAL

## 2019-01-01 VITALS
SYSTOLIC BLOOD PRESSURE: 111 MMHG | DIASTOLIC BLOOD PRESSURE: 56 MMHG | HEART RATE: 70 BPM | WEIGHT: 150 LBS | HEIGHT: 66 IN | BODY MASS INDEX: 24.11 KG/M2

## 2019-01-01 VITALS
HEART RATE: 75 BPM | DIASTOLIC BLOOD PRESSURE: 65 MMHG | WEIGHT: 153.88 LBS | BODY MASS INDEX: 24.73 KG/M2 | HEIGHT: 66 IN | SYSTOLIC BLOOD PRESSURE: 117 MMHG

## 2019-01-01 VITALS
BODY MASS INDEX: 24.11 KG/M2 | DIASTOLIC BLOOD PRESSURE: 60 MMHG | WEIGHT: 150 LBS | HEIGHT: 66 IN | SYSTOLIC BLOOD PRESSURE: 119 MMHG | HEART RATE: 72 BPM

## 2019-01-01 DIAGNOSIS — Z95.0 CARDIAC PACEMAKER IN SITU: ICD-10-CM

## 2019-01-01 DIAGNOSIS — H35.372 EPIRETINAL MEMBRANE, LEFT: ICD-10-CM

## 2019-01-01 DIAGNOSIS — Z96.1 PSEUDOPHAKIA, BOTH EYES: ICD-10-CM

## 2019-01-01 DIAGNOSIS — I48.0 PAF (PAROXYSMAL ATRIAL FIBRILLATION): Primary | ICD-10-CM

## 2019-01-01 DIAGNOSIS — I10 ESSENTIAL HYPERTENSION: ICD-10-CM

## 2019-01-01 DIAGNOSIS — S52.572D OTHER CLOSED INTRA-ARTICULAR FRACTURE OF DISTAL END OF LEFT RADIUS WITH ROUTINE HEALING, SUBSEQUENT ENCOUNTER: Primary | ICD-10-CM

## 2019-01-01 DIAGNOSIS — Z95.0 CARDIAC PACEMAKER IN SITU: Primary | ICD-10-CM

## 2019-01-01 DIAGNOSIS — M25.532 LEFT WRIST PAIN: ICD-10-CM

## 2019-01-01 DIAGNOSIS — H40.033 ANATOMICAL NARROW ANGLE GLAUCOMA, BILATERAL: Primary | ICD-10-CM

## 2019-01-01 DIAGNOSIS — H20.12 CHRONIC ANTERIOR UVEITIS, LEFT: ICD-10-CM

## 2019-01-01 DIAGNOSIS — R55 SYNCOPE, UNSPECIFIED SYNCOPE TYPE: ICD-10-CM

## 2019-01-01 DIAGNOSIS — S52.572A OTHER CLOSED INTRA-ARTICULAR FRACTURE OF DISTAL END OF LEFT RADIUS, INITIAL ENCOUNTER: Primary | ICD-10-CM

## 2019-01-01 DIAGNOSIS — E10.9 TYPE 1 DIABETES MELLITUS WITHOUT RETINOPATHY: ICD-10-CM

## 2019-01-01 DIAGNOSIS — I49.5 SICK SINUS SYNDROME: ICD-10-CM

## 2019-01-01 DIAGNOSIS — H40.033 ANATOMICAL NARROW ANGLE GLAUCOMA, BILATERAL: ICD-10-CM

## 2019-01-01 DIAGNOSIS — M25.532 LEFT WRIST PAIN: Primary | ICD-10-CM

## 2019-01-01 DIAGNOSIS — I63.412 CEREBRAL INFARCTION DUE TO EMBOLISM OF LEFT MIDDLE CEREBRAL ARTERY: ICD-10-CM

## 2019-01-01 PROCEDURE — 3074F PR MOST RECENT SYSTOLIC BLOOD PRESSURE < 130 MM HG: ICD-10-PCS | Mod: CPTII,S$GLB,, | Performed by: INTERNAL MEDICINE

## 2019-01-01 PROCEDURE — 93299 CARDIAC DEVICE CHECK - REMOTE: ICD-10-PCS | Mod: S$GLB,,, | Performed by: INTERNAL MEDICINE

## 2019-01-01 PROCEDURE — 99214 OFFICE O/P EST MOD 30 MIN: CPT | Mod: S$GLB,,, | Performed by: INTERNAL MEDICINE

## 2019-01-01 PROCEDURE — 99999 PR PBB SHADOW E&M-EST. PATIENT-LVL III: ICD-10-PCS | Mod: PBBFAC,,, | Performed by: OPHTHALMOLOGY

## 2019-01-01 PROCEDURE — 99999 PR PBB SHADOW E&M-EST. PATIENT-LVL II: CPT | Mod: PBBFAC,,, | Performed by: OPHTHALMOLOGY

## 2019-01-01 PROCEDURE — 99999 PR PBB SHADOW E&M-EST. PATIENT-LVL II: ICD-10-PCS | Mod: PBBFAC,,, | Performed by: OPHTHALMOLOGY

## 2019-01-01 PROCEDURE — 3078F PR MOST RECENT DIASTOLIC BLOOD PRESSURE < 80 MM HG: ICD-10-PCS | Mod: CPTII,S$GLB,, | Performed by: INTERNAL MEDICINE

## 2019-01-01 PROCEDURE — 73110 X-RAY EXAM OF WRIST: CPT | Mod: TC,PO,LT

## 2019-01-01 PROCEDURE — 73110 XR WRIST COMPLETE 3 VIEWS LEFT: ICD-10-PCS | Mod: 26,LT,, | Performed by: RADIOLOGY

## 2019-01-01 PROCEDURE — 92012 INTRM OPH EXAM EST PATIENT: CPT | Mod: S$GLB,,, | Performed by: OPHTHALMOLOGY

## 2019-01-01 PROCEDURE — 99999 PR PBB SHADOW E&M-EST. PATIENT-LVL III: CPT | Mod: PBBFAC,,, | Performed by: INTERNAL MEDICINE

## 2019-01-01 PROCEDURE — 99999 PR PBB SHADOW E&M-EST. PATIENT-LVL III: ICD-10-PCS | Mod: PBBFAC,,, | Performed by: INTERNAL MEDICINE

## 2019-01-01 PROCEDURE — 3078F DIAST BP <80 MM HG: CPT | Mod: CPTII,S$GLB,, | Performed by: INTERNAL MEDICINE

## 2019-01-01 PROCEDURE — 73110 X-RAY EXAM OF WRIST: CPT | Mod: 26,LT,, | Performed by: RADIOLOGY

## 2019-01-01 PROCEDURE — 99024 PR POST-OP FOLLOW-UP VISIT: ICD-10-PCS | Mod: S$GLB,,, | Performed by: ORTHOPAEDIC SURGERY

## 2019-01-01 PROCEDURE — 99999 PR PBB SHADOW E&M-EST. PATIENT-LVL III: ICD-10-PCS | Mod: PBBFAC,,, | Performed by: ORTHOPAEDIC SURGERY

## 2019-01-01 PROCEDURE — 93297 REM INTERROG DEV EVAL ICPMS: CPT | Mod: S$GLB,,, | Performed by: INTERNAL MEDICINE

## 2019-01-01 PROCEDURE — 1101F PT FALLS ASSESS-DOCD LE1/YR: CPT | Mod: CPTII,S$GLB,, | Performed by: INTERNAL MEDICINE

## 2019-01-01 PROCEDURE — 99024 POSTOP FOLLOW-UP VISIT: CPT | Mod: S$GLB,,, | Performed by: ORTHOPAEDIC SURGERY

## 2019-01-01 PROCEDURE — 93294 CARDIAC DEVICE CHECK - REMOTE: ICD-10-PCS | Mod: ,,, | Performed by: INTERNAL MEDICINE

## 2019-01-01 PROCEDURE — 99999 PR PBB SHADOW E&M-EST. PATIENT-LVL III: CPT | Mod: PBBFAC,,, | Performed by: OPHTHALMOLOGY

## 2019-01-01 PROCEDURE — 93294 REM INTERROG EVL PM/LDLS PM: CPT | Mod: ,,, | Performed by: INTERNAL MEDICINE

## 2019-01-01 PROCEDURE — 99999 PR PBB SHADOW E&M-EST. PATIENT-LVL III: CPT | Mod: PBBFAC,,, | Performed by: ORTHOPAEDIC SURGERY

## 2019-01-01 PROCEDURE — 99214 PR OFFICE/OUTPT VISIT, EST, LEVL IV, 30-39 MIN: ICD-10-PCS | Mod: S$GLB,,, | Performed by: INTERNAL MEDICINE

## 2019-01-01 PROCEDURE — 93296 REM INTERROG EVL PM/IDS: CPT | Mod: PBBFAC,PO | Performed by: INTERNAL MEDICINE

## 2019-01-01 PROCEDURE — 1101F PR PT FALLS ASSESS DOC 0-1 FALLS W/OUT INJ PAST YR: ICD-10-PCS | Mod: CPTII,S$GLB,, | Performed by: INTERNAL MEDICINE

## 2019-01-01 PROCEDURE — 93297 CARDIAC DEVICE CHECK - REMOTE: ICD-10-PCS | Mod: S$GLB,,, | Performed by: INTERNAL MEDICINE

## 2019-01-01 PROCEDURE — 93299 CARDIAC DEVICE CHECK - REMOTE: CPT | Mod: S$GLB,,, | Performed by: INTERNAL MEDICINE

## 2019-01-01 PROCEDURE — 92012 PR EYE EXAM, EST PATIENT,INTERMED: ICD-10-PCS | Mod: S$GLB,,, | Performed by: OPHTHALMOLOGY

## 2019-01-01 PROCEDURE — 3074F SYST BP LT 130 MM HG: CPT | Mod: CPTII,S$GLB,, | Performed by: INTERNAL MEDICINE

## 2019-01-01 RX ORDER — OLMESARTAN MEDOXOMIL 5 MG/1
5 TABLET ORAL 2 TIMES DAILY
COMMUNITY

## 2019-01-01 RX ORDER — METOPROLOL TARTRATE 50 MG/1
50 TABLET ORAL 2 TIMES DAILY
COMMUNITY

## 2019-01-01 RX ORDER — BRIMONIDINE TARTRATE, TIMOLOL MALEATE 2; 5 MG/ML; MG/ML
SOLUTION/ DROPS OPHTHALMIC
Qty: 10 ML | Refills: 11 | Status: SHIPPED | OUTPATIENT
Start: 2019-01-01

## 2019-01-01 RX ORDER — BENZONATATE 100 MG/1
100 CAPSULE ORAL
COMMUNITY
End: 2019-01-01

## 2019-01-01 RX ORDER — DORZOLAMIDE HCL 20 MG/ML
1 SOLUTION/ DROPS OPHTHALMIC 3 TIMES DAILY
Qty: 10 ML | Refills: 6 | Status: SHIPPED | OUTPATIENT
Start: 2019-01-01 | End: 2020-07-23

## 2019-01-01 RX ORDER — BIMATOPROST 0.3 MG/ML
1 SOLUTION/ DROPS OPHTHALMIC NIGHTLY
Qty: 7.5 ML | Refills: 2 | Status: SHIPPED | OUTPATIENT
Start: 2019-01-01 | End: 2020-07-23

## 2019-01-01 RX ORDER — ACETAMINOPHEN 500 MG
1 TABLET ORAL DAILY
COMMUNITY
End: 2019-01-01 | Stop reason: HOSPADM

## 2019-01-01 RX ORDER — ESCITALOPRAM OXALATE 10 MG/1
10 TABLET ORAL DAILY
Refills: 3 | COMMUNITY
Start: 2019-01-28

## 2019-01-01 RX ORDER — AZITHROMYCIN 250 MG/1
250 TABLET, FILM COATED ORAL DAILY
Status: ON HOLD | COMMUNITY
End: 2019-01-01 | Stop reason: HOSPADM

## 2019-01-01 RX ORDER — BLOOD SUGAR DIAGNOSTIC
STRIP MISCELLANEOUS
Refills: 8 | Status: ON HOLD | COMMUNITY
Start: 2019-01-09 | End: 2019-01-01 | Stop reason: HOSPADM

## 2019-01-01 RX ORDER — LORATADINE 10 MG/1
10 TABLET ORAL DAILY PRN
COMMUNITY
End: 2019-01-01

## 2019-01-01 RX ORDER — OXYBUTYNIN CHLORIDE 5 MG/1
5 TABLET, EXTENDED RELEASE ORAL DAILY
COMMUNITY

## 2019-01-01 RX ORDER — BENAZEPRIL HYDROCHLORIDE 10 MG/1
10 TABLET ORAL DAILY
COMMUNITY
End: 2019-01-01

## 2019-03-19 NOTE — PROGRESS NOTES
HPI     Glaucoma      Additional comments: 4 month iop ck with hvf review and gonio              Comments     DLS: 11/14/18    Pt states no changes since last visit. Has moved to assisted living   facility and needs some orders filled out.     Gtts: Lumigan QHS OS, Dorzolamide TID OS, Combigan BID OU          Last edited by Cheryle Quintana on 3/19/2019  3:04 PM. (History)        ROS     Negative for: Constitutional, Gastrointestinal, Neurological, Skin,   Genitourinary, Musculoskeletal, HENT, Endocrine, Cardiovascular, Eyes,   Respiratory, Psychiatric, Allergic/Imm, Heme/Lymph    Last edited by Richard Holbrook Jr., MD on 3/19/2019  3:58 PM. (History)        Assessment /Plan     For exam results, see Encounter Report.    Anatomical narrow angle glaucoma, bilateral    Epiretinal membrane, left    Chronic anterior uveitis, left    Pseudophakia, both eyes    Type 1 diabetes mellitus without retinopathy        Anatomical narrow angle glaucoma, bilateral  - Good IOP today Bimatoprost OS QHS and Dorazolamide OS TID and Combigan OU; continue current drop regimen      Epiretinal membrane, left  -Posterior Segment OCT Retina-Both eyes showed small PED's OU w/ ERM OS  -No change in vision  -Continue Amsler monitoring  -Counseled patient that if symptoms worsen; may need retina surgery to definitively correct the problem    Chronic anterior uveitis, left   - No A/C rxn noted today, finish Ilvero; hold nevanac  -Counseled patient on symptoms of ocular inflammation, and advised to start nevanac 3x daily and RTC      Pseudophakia OS   - mild PCO on NDFE. Observe for now, as also has ERM.     Type 1 diabetes mellitus without retinopathy  - stable. F/u Diabetic Dilated Exam yearly     Follow-up in about 4 months (around 7/19/2019) for IOP and Medication check.

## 2019-03-19 NOTE — PATIENT INSTRUCTIONS
"  What Is Glaucoma?    Glaucoma is an eye disease that can cause blindness. If caught early, it can usually be controlled. But it often has no symptoms, so you need regular eye exams. Glaucoma usually begins when pressure builds up in the eye. This pressure can damage the optic nerve. The optic nerve sends messages to the brain so you can see. There are two main kinds of glaucoma: "open-angle" and "closed-angle."  Drainage area  The eye is always producing fluid. The eye's drainage areas may become clogged or blocked. Too much fluid stays in the eye. This increases eye pressure.  Optic nerve  Too much pressure in the eye can damage the optic nerve. If damaged, this nerve cannot send the messages to the brain that let you see.  Open-Angle Glaucoma  Open-angle is the most common kind of glaucoma. It occurs slowly as people age. The drainage area in the eye becomes clogged. Not enough fluid drains from the eye, so pressure slowly builds up. This causes gradual loss of side (peripheral) vision. You may not even notice changes until much of your vision is lost.  Closed-Angle Glaucoma  Closed-angle glaucoma is less common than open-angle. It usually comes on quickly. The drainage area in the eye suddenly becomes completely blocked. Eye pressure builds rapidly. You may notice blurred vision and rainbow halos around lights. You may also have headaches, nausea, vomiting, and severe pain. If not treated right away, blindness can occur quickly.  Date Last Reviewed: 6/1/2015  © 5931-3503 [employer]. [address] [place] [address], [place] [address]. All rights reserved. This information is not intended as a substitute for professional medical care. Always follow your healthcare professional's instructions.        "

## 2019-07-24 NOTE — PROGRESS NOTES
HPI     DLS- 3/19/19     Pt is here for 4 month IOP check. Pt states, no changes since last seen.   Pt is taking Lumigan x1 OS, Combigan BID OU, Dorzolamide x3 OS and Ilevro   prn.     Last edited by Sedrick Brunson on 7/24/2019  2:27 PM. (History)            Assessment /Plan     For exam results, see Encounter Report.    Anatomical narrow angle glaucoma, bilateral  -     COMBIGAN 0.2-0.5 % Drop; INT 1 GTT IN OU BID  Dispense: 10 mL; Refill: 11  -     dorzolamide (TRUSOPT) 2 % ophthalmic solution; Place 1 drop into the left eye 3 (three) times daily.  Dispense: 10 mL; Refill: 6  -     bimatoprost (LUMIGAN) 0.03 % ophthalmic drops; Place 1 drop into the left eye every evening.  Dispense: 7.5 mL; Refill: 2    Epiretinal membrane, left    Chronic anterior uveitis, left    Pseudophakia, both eyes    Type 1 diabetes mellitus without retinopathy        Anatomical narrow angle glaucoma, bilateral  - Good IOP today Bimatoprost OS QHS and Dorazolamide OS TID and Combigan OU; continue current drop regimen      Epiretinal membrane, left  -Posterior Segment OCT Retina-Both eyes showed small PED's OU w/ ERM OS  -No change in vision  -Continue Amsler monitoring  -Counseled patient that if symptoms worsen; may need retina surgery to definitively correct the problem    Chronic anterior uveitis, left   - No A/C rxn noted today, finish Ilvero; hold nevanac  -Counseled patient on symptoms of ocular inflammation, and advised to start nevanac 3x daily and RTC      Pseudophakia OS   - mild PCO on NDFE. Observe for now, as also has ERM.     Type 1 diabetes mellitus without retinopathy  - stable. F/u Diabetic Dilated Exam yearly     Follow up in about 4 months (around 11/24/2019) for IOP and Medication check.

## 2019-09-10 NOTE — PROGRESS NOTES
Subjective:    Patient ID:  April Lea is a 79 y.o. female who presents for follow-up of PAF    HPI  She comes with no complaints, no chest pain, no shortness of breath  FC II    Review of Systems   Constitution: Negative for decreased appetite, malaise/fatigue, weight gain and weight loss.   Cardiovascular: Negative for chest pain, dyspnea on exertion, leg swelling, palpitations and syncope.   Respiratory: Negative for cough and shortness of breath.    Gastrointestinal: Negative.    Neurological: Negative for weakness.   All other systems reviewed and are negative.       Objective:      Physical Exam   Constitutional: She is oriented to person, place, and time. She appears well-developed and well-nourished.   HENT:   Head: Normocephalic.   Eyes: Pupils are equal, round, and reactive to light.   Neck: Normal range of motion. Neck supple. No JVD present. Carotid bruit is not present. No thyromegaly present.   Cardiovascular: Normal rate, regular rhythm, normal heart sounds, intact distal pulses and normal pulses. PMI is not displaced. Exam reveals no gallop.   No murmur heard.  Pulmonary/Chest: Effort normal and breath sounds normal.   Abdominal: Soft. Normal appearance. She exhibits no mass. There is no hepatosplenomegaly. There is no tenderness.   Musculoskeletal: Normal range of motion. She exhibits no edema.   Neurological: She is alert and oriented to person, place, and time. She has normal strength and normal reflexes. No sensory deficit.   Skin: Skin is warm and intact.   Psychiatric: She has a normal mood and affect.   Nursing note and vitals reviewed.        Assessment:       1. PAF (paroxysmal atrial fibrillation)    2. Essential hypertension    3. Cerebral infarction due to embolism of left middle cerebral artery         Plan:     Continue all cardiac medications  Regular exercise program  Weight loss  9 m f/u

## 2019-10-14 NOTE — TELEPHONE ENCOUNTER
Called daughter back. She wanted clarification on a few things from the discharge papers. States that pt has a dark purple bruise on her thumb, and her first 2 fingers are swollen. She wanted to clarify that the bruising is not what was meant on the paper when it said to contact physician right away if they notice fingers turning blue. Advised daughter that the bruising is normal with fractures. The swelling is too, they need to make sure that pt is elevating her hand above her heart. She was shown how to check capillary refill by the nurse and states that pt has good refill (less than 3 seconds). She was also shown to check for room between the hand and the splint. She states that the nurse was able to get a finger in between the pts hand and the splint. Pt does not have any tingling sensations to the fingers. She will continue to ice the area as directed. Advised daughter to call back if any changes in condition, or if she has any further questions. Thanks, Ju

## 2019-10-14 NOTE — TELEPHONE ENCOUNTER
[facility] Staff   Caller: Rita / daughter (Today,  2:36 PM)             Question about condition, [phone]

## 2019-10-22 PROBLEM — S52.502A CLOSED FRACTURE OF LEFT DISTAL RADIUS: Status: ACTIVE | Noted: 2019-01-01

## 2019-10-22 NOTE — PROGRESS NOTES
Ms Lea returns to clinic today. She is approximately 1 week status post left distal radius fracture. She has been in a sugar-tong splint.  She is overall well with has been having trouble with ambulating    Physical exam:  Examination of the left upper extremity reveals that there is a well-fitting and well-molded sugar-tong splint in place. It is well maintained.  There is mild-to-moderate edema both fingers.  She does report intact sensation in the median radial and ulnar distribution.  Capillary refill is less than 2 sec at the tips of the fingers.    Radiology:  X-rays of the left wrist were taken in clinic today. She is noted to have a comminuted fracture of the distal radius.  The fracture alignment remained stable with radial height maintained and volar tilt at neutral.    Assessment:  Left distal radius fracture    Plan:    1.  I will maintain the sugar-tong splint    2.  She will continue limited weight-bearing to the left upper extremity    3.  Follow up with me in 1 week with repeat x-rays of the left wrist in the splint

## 2019-10-28 NOTE — PROGRESS NOTES
Ms Lea returns to clinic today.  She is approximately 2 weeks status post left intra-articular distal radius fracture.  She has been in a sugar-tong splint.  She is moderately deconditioned.  She is not complaining of pain.    Physical exam:  Examination of the left upper extremity reveals that she has mild edema to the hands and fingers.  The splint is well maintained.  She does report intact sensation in the median radial ulnar distribution.  His capillary refill less than 2 sec in the fingers.    Radiology:  X-rays of the left wrist were taken in clinic today. She is noted to have a intra-articular fracture of the distal radius.  She has approximately 2 mm loss of height.  There is a 2 mm widening of the articular surface as the radial styloid fragment has shifted a small amount radially.  Tilt remains neutral.    Assessment:  Left distal radius fracture    Plan:    1.  I had a long discussion with the patient and her family about treatment options.  I have discussed open reduction and internal fixation versus continued cast treatment.  Due to the patient's sedentary lifestyle and Medical comorbidities the patient and her family have elected to undergo cast treatment.    2.  She will follow up with me in 1 week with repeat x-rays of the left wrist out of the splint at which point we will consider going to a short-arm cast

## 2019-10-29 PROBLEM — D62 ACUTE BLOOD LOSS ANEMIA: Status: ACTIVE | Noted: 2019-01-01

## 2019-10-29 PROBLEM — J15.9 BACTERIAL PNEUMONIA: Status: ACTIVE | Noted: 2019-01-01

## 2019-11-01 NOTE — TELEPHONE ENCOUNTER
Rita states pt has been hospitalized for pneumonia.  Rita requesting patient's appointment for 11/4/19 to be cancelled due to being in hospital.  Rita asking if there should be concerns that pt will have had splint in place for 3 weeks by Monday.  I informed Rita that Dr. Banerjee is not in clinic today, will return on Monday, and that I will message him update on pt to see if he has any recommendations.  I encouraged Rita to call me when she knows patient's discharge date so that I can schedule pt appointment with provider next day he is in clinic.

## 2019-11-01 NOTE — TELEPHONE ENCOUNTER
----- Message from Karthik Kraft MA sent at 11/1/2019 12:53 PM CDT -----  Contact: daughter Rita  Patient is in hospital with pneumonia, will not be able to make post op appointment for Monday 11/4 will have to reschedule. She was to have the cast taken off, daughter want's to know what to do about the cast.  Call back

## 2019-11-02 PROBLEM — K25.9 GASTRIC ULCER WITHOUT HEMORRHAGE, PERFORATION, OR OBSTRUCTION: Status: ACTIVE | Noted: 2019-01-01

## 2019-11-02 PROBLEM — Z75.8 DISCHARGE PLANNING ISSUES: Status: ACTIVE | Noted: 2019-01-01

## 2019-11-11 NOTE — TELEPHONE ENCOUNTER
----- Message from Srinivasan Persaud sent at 11/11/2019  1:26 PM CST -----  Contact: Daughter Rita  Daughter Rita called she want to speak with a nurse regarding a appointment, please call back at [phone].

## 2019-11-11 NOTE — TELEPHONE ENCOUNTER
Rita states Dr. Banerjee saw pt in the hospital last week and fitted pt for a short arm cast.  Rita reports provider requested pt follow-up in 10-14 days in clinic.  I informed Rita that I will call her back today with a follow-up appointment time.  Rita requesting a morning appointment.

## 2019-11-18 PROBLEM — R91.8 BILATERAL PULMONARY INFILTRATES ON CHEST X-RAY: Status: ACTIVE | Noted: 2019-01-01

## 2019-11-18 PROBLEM — D50.9 IRON DEFICIENCY ANEMIA: Status: ACTIVE | Noted: 2019-01-01

## 2019-11-18 PROBLEM — J96.01 ACUTE HYPOXEMIC RESPIRATORY FAILURE: Status: ACTIVE | Noted: 2019-01-01

## 2019-11-19 PROBLEM — N30.00 ACUTE CYSTITIS: Status: ACTIVE | Noted: 2019-01-01

## 2019-12-09 PROBLEM — J96.01 ACUTE RESPIRATORY FAILURE WITH HYPOXIA: Status: ACTIVE | Noted: 2019-01-01

## 2019-12-16 PROBLEM — J84.9 INTERSTITIAL LUNG DISEASE: Status: ACTIVE | Noted: 2019-01-01

## 2019-12-23 NOTE — TELEPHONE ENCOUNTER
----- Message from Abram Baldwin sent at 12/23/2019  2:43 PM CST -----  Contact: Mariela with   Type: Needs Medical Advice    Who Called:  Mariela Espinoza Call Back Number: [phone]  Additional Information: Needs to discuss the splint for the pt. Needs to know how much longer the pt needs to wear the splint. Please call to advise.

## 2019-12-23 NOTE — TELEPHONE ENCOUNTER
I informed Mariela Banerjee stated he will review patient's xray this evening for determination of further splinting.  Mariela verbalized understanding.

## 2019-12-26 PROBLEM — R29.898 MUSCULAR DECONDITIONING: Status: ACTIVE | Noted: 2019-01-01

## 2019-12-26 PROBLEM — T38.0X5A STEROID-INDUCED MYOPATHY: Status: ACTIVE | Noted: 2019-01-01

## 2019-12-26 PROBLEM — J18.9 PNEUMONIA DUE TO INFECTIOUS ORGANISM: Status: ACTIVE | Noted: 2019-01-01

## 2019-12-26 PROBLEM — G72.0 STEROID-INDUCED MYOPATHY: Status: ACTIVE | Noted: 2019-01-01

## 2020-01-10 PROBLEM — D64.9 SYMPTOMATIC ANEMIA: Status: ACTIVE | Noted: 2020-01-01

## 2020-01-10 PROBLEM — B44.9 ASPERGILLUS PNEUMONIA: Status: ACTIVE | Noted: 2020-01-01

## 2020-01-10 PROBLEM — Z66 DNR (DO NOT RESUSCITATE): Status: ACTIVE | Noted: 2020-01-01

## 2020-01-10 PROBLEM — J96.21 ACUTE ON CHRONIC RESPIRATORY FAILURE WITH HYPOXIA: Status: ACTIVE | Noted: 2019-01-01

## 2020-01-10 PROBLEM — K25.7 CHRONIC GASTRIC ULCER WITHOUT HEMORRHAGE AND WITHOUT PERFORATION: Status: ACTIVE | Noted: 2020-01-01

## 2020-01-11 PROBLEM — J47.1 BRONCHIECTASIS WITH ACUTE EXACERBATION: Status: ACTIVE | Noted: 2020-01-01

## 2020-01-12 PROBLEM — J81.0 ACUTE PULMONARY EDEMA: Status: ACTIVE | Noted: 2020-01-01

## 2020-01-12 PROBLEM — E87.6 HYPOKALEMIA: Status: ACTIVE | Noted: 2020-01-01

## 2020-01-16 PROBLEM — A31.9 MYCOBACTERIUM INFECTION: Status: ACTIVE | Noted: 2020-01-01

## 2020-01-18 PROBLEM — R79.89 ELEVATED LFTS: Status: ACTIVE | Noted: 2020-01-01

## 2020-01-26 PROBLEM — E87.6 HYPOKALEMIA: Status: RESOLVED | Noted: 2020-01-01 | Resolved: 2020-01-01

## 2020-01-29 PROBLEM — J96.20 ACUTE ON CHRONIC RESPIRATORY FAILURE: Status: ACTIVE | Noted: 2020-01-01

## 2023-10-17 NOTE — PROGRESS NOTES
See cardiology note.   Consent: The patient's consent was obtained including but not limited to risks of crusting, scabbing, blistering, scarring, darker or lighter pigmentary change, recurrence, incomplete removal and infection. Post-Care Instructions: I reviewed with the patient in detail post-care instructions. Patient is to wear sunprotection, and avoid picking at any of the treated lesions. Pt may apply Vaseline to crusted or scabbing areas. Show Applicator Variable?: Yes Duration Of Freeze Thaw-Cycle (Seconds): 0 Render Post-Care Instructions In Note?: no Number Of Freeze-Thaw Cycles: 1 freeze-thaw cycle Detail Level: Detailed

## (undated) DEVICE — SEE MEDLINE ITEM 157128

## (undated) DEVICE — PACK OPHTHALMIC

## (undated) DEVICE — CANNULA ANTERIOR CHAMBER 30G

## (undated) DEVICE — SOL BETADINE 5%

## (undated) DEVICE — SOL IRR STRL WATER 500ML

## (undated) DEVICE — GLOVE BIOGEL ECLIPSE SZ 7

## (undated) DEVICE — SPONGE WEC CEL SPEARS

## (undated) DEVICE — SEE L#120831

## (undated) DEVICE — PACK EYE CUSTOM COVINGTON.

## (undated) DEVICE — SOL IRR BSS OPHTH 500ML STRL

## (undated) DEVICE — SUT 10/0 1X12IN BLK TG160-6

## (undated) DEVICE — SYR LUER LOCK 1CC